# Patient Record
Sex: FEMALE | Race: WHITE | NOT HISPANIC OR LATINO | Employment: FULL TIME
[De-identification: names, ages, dates, MRNs, and addresses within clinical notes are randomized per-mention and may not be internally consistent; named-entity substitution may affect disease eponyms.]

---

## 2017-07-15 ENCOUNTER — HEALTH MAINTENANCE LETTER (OUTPATIENT)
Age: 37
End: 2017-07-15

## 2017-08-16 LAB
ABO + RH BLD: NORMAL
ABO + RH BLD: NORMAL
BLD GP AB SCN SERPL QL: NORMAL
C TRACH DNA SPEC QL PROBE+SIG AMP: NOT DETECTED
HBV SURFACE AG SERPL QL IA: NON REACTIVE
HIV 1+2 AB+HIV1 P24 AG SERPL QL IA: NON REACTIVE
N GONORRHOEA DNA SPEC QL PROBE+SIG AMP: NOT DETECTED
RUBELLA ANTIBODY IGG QUANTITATIVE: NORMAL IU/ML
T PALLIDUM IGG SER QL: NORMAL

## 2017-10-19 ENCOUNTER — TRANSFERRED RECORDS (OUTPATIENT)
Dept: HEALTH INFORMATION MANAGEMENT | Facility: CLINIC | Age: 37
End: 2017-10-19

## 2017-12-26 LAB
GLU, 1 HOUR, 100 G: 104
GLU, 2 HOUR, 100 G: 79
GLU, 3 HOUR, 100 G: 81

## 2018-02-20 LAB — GROUP B STREP PCR: POSITIVE

## 2018-03-06 ENCOUNTER — HOSPITAL ENCOUNTER (INPATIENT)
Facility: CLINIC | Age: 38
LOS: 2 days | Discharge: HOME | End: 2018-03-08
Attending: ADVANCED PRACTICE MIDWIFE | Admitting: ADVANCED PRACTICE MIDWIFE
Payer: COMMERCIAL

## 2018-03-06 DIAGNOSIS — O34.219 VAGINAL BIRTH AFTER CESAREAN (VBAC): Primary | ICD-10-CM

## 2018-03-06 DIAGNOSIS — D50.8 OTHER IRON DEFICIENCY ANEMIA: ICD-10-CM

## 2018-03-06 PROBLEM — Z98.891 H/O: C-SECTION: Status: ACTIVE | Noted: 2018-03-06

## 2018-03-06 LAB
ERYTHROCYTE [DISTWIDTH] IN BLOOD BY AUTOMATED COUNT: 12.6 % (ref 10–15)
HCT VFR BLD AUTO: 35.9 % (ref 35–47)
HGB BLD-MCNC: 12.4 G/DL (ref 11.7–15.7)
MCH RBC QN AUTO: 30.5 PG (ref 26.5–33)
MCHC RBC AUTO-ENTMCNC: 34.5 G/DL (ref 31.5–36.5)
MCV RBC AUTO: 88 FL (ref 78–100)
PLATELET # BLD AUTO: 222 10E9/L (ref 150–450)
RBC # BLD AUTO: 4.07 10E12/L (ref 3.8–5.2)
WBC # BLD AUTO: 11.8 10E9/L (ref 4–11)

## 2018-03-06 PROCEDURE — 85027 COMPLETE CBC AUTOMATED: CPT | Performed by: OBSTETRICS & GYNECOLOGY

## 2018-03-06 PROCEDURE — 25000128 H RX IP 250 OP 636: Performed by: OBSTETRICS & GYNECOLOGY

## 2018-03-06 PROCEDURE — 86901 BLOOD TYPING SEROLOGIC RH(D): CPT | Performed by: OBSTETRICS & GYNECOLOGY

## 2018-03-06 PROCEDURE — 86780 TREPONEMA PALLIDUM: CPT | Performed by: OBSTETRICS & GYNECOLOGY

## 2018-03-06 PROCEDURE — 86850 RBC ANTIBODY SCREEN: CPT | Performed by: OBSTETRICS & GYNECOLOGY

## 2018-03-06 PROCEDURE — 12000030 ZZH R&B OB INTERMEDIATE UMMC

## 2018-03-06 PROCEDURE — 86592 SYPHILIS TEST NON-TREP QUAL: CPT | Performed by: OBSTETRICS & GYNECOLOGY

## 2018-03-06 PROCEDURE — 86900 BLOOD TYPING SEROLOGIC ABO: CPT | Performed by: OBSTETRICS & GYNECOLOGY

## 2018-03-06 RX ORDER — CARBOPROST TROMETHAMINE 250 UG/ML
250 INJECTION, SOLUTION INTRAMUSCULAR
Status: DISCONTINUED | OUTPATIENT
Start: 2018-03-06 | End: 2018-03-07

## 2018-03-06 RX ORDER — LIDOCAINE 40 MG/G
CREAM TOPICAL
Status: DISCONTINUED | OUTPATIENT
Start: 2018-03-06 | End: 2018-03-07

## 2018-03-06 RX ORDER — SODIUM CHLORIDE, SODIUM LACTATE, POTASSIUM CHLORIDE, CALCIUM CHLORIDE 600; 310; 30; 20 MG/100ML; MG/100ML; MG/100ML; MG/100ML
INJECTION, SOLUTION INTRAVENOUS CONTINUOUS
Status: DISCONTINUED | OUTPATIENT
Start: 2018-03-06 | End: 2018-03-07

## 2018-03-06 RX ORDER — OXYTOCIN/0.9 % SODIUM CHLORIDE 30/500 ML
100-340 PLASTIC BAG, INJECTION (ML) INTRAVENOUS CONTINUOUS PRN
Status: COMPLETED | OUTPATIENT
Start: 2018-03-06 | End: 2018-03-07

## 2018-03-06 RX ORDER — MISOPROSTOL 200 UG/1
TABLET ORAL
Status: DISCONTINUED
Start: 2018-03-06 | End: 2018-03-07 | Stop reason: HOSPADM

## 2018-03-06 RX ORDER — IBUPROFEN 800 MG/1
800 TABLET, FILM COATED ORAL
Status: DISCONTINUED | OUTPATIENT
Start: 2018-03-06 | End: 2018-03-07

## 2018-03-06 RX ORDER — ONDANSETRON 2 MG/ML
4 INJECTION INTRAMUSCULAR; INTRAVENOUS EVERY 6 HOURS PRN
Status: DISCONTINUED | OUTPATIENT
Start: 2018-03-06 | End: 2018-03-07

## 2018-03-06 RX ORDER — OXYTOCIN 10 [USP'U]/ML
10 INJECTION, SOLUTION INTRAMUSCULAR; INTRAVENOUS
Status: DISCONTINUED | OUTPATIENT
Start: 2018-03-06 | End: 2018-03-07

## 2018-03-06 RX ORDER — OMEGA-3-ACID ETHYL ESTERS 1 G/1
2 CAPSULE, LIQUID FILLED ORAL 2 TIMES DAILY
COMMUNITY
End: 2023-12-18

## 2018-03-06 RX ORDER — METHYLERGONOVINE MALEATE 0.2 MG/ML
200 INJECTION INTRAVENOUS
Status: DISCONTINUED | OUTPATIENT
Start: 2018-03-06 | End: 2018-03-07

## 2018-03-06 RX ORDER — OXYCODONE AND ACETAMINOPHEN 5; 325 MG/1; MG/1
1 TABLET ORAL
Status: DISCONTINUED | OUTPATIENT
Start: 2018-03-06 | End: 2018-03-07

## 2018-03-06 RX ADMIN — SODIUM CHLORIDE, POTASSIUM CHLORIDE, SODIUM LACTATE AND CALCIUM CHLORIDE 1000 ML: 600; 310; 30; 20 INJECTION, SOLUTION INTRAVENOUS at 23:15

## 2018-03-06 NOTE — IP AVS SNAPSHOT
MRN:[id number]                      After Visit Summary   3/6/2018    Alexandra Agarwal    MRN: [id number]           Thank you!     Thank you for choosing [facility] for your care. Our goal is always to provide you with excellent care. Hearing back from our patients is one way we can continue to improve our services. Please take a few minutes to complete the written survey that you may receive in the mail after you visit with us. Thank you!        Patient Information     Date Of Birth          1980        Designated Caregiver       Most Recent Value    Caregiver    Will someone help with your care after discharge? no      About your hospital stay     You were admitted on:  March 6, 2018 You last received care in the:  [facility]    You were discharged on:  March 8, 2018        Reason for your hospital stay       Maternity care                  Who to Call     For medical emergencies, please call 911.  For non-urgent questions about your medical care, please call your primary care provider or clinic, None          Attending Provider     Provider Specialty    Hedy Cary, JAMEI [facility]ERS [facility]    Sadi Orellana APRN CN[facility]       Primary Care Provider Fax #    Physician No Ref-Primary [phone]      After Care Instructions     Activity       Review discharge instructions            Diet       Resume previous diet            Discharge Instructions - Gestational diabetic patients       Gestational diabetic patients to follow up for fasting blood sugar and 2 hour 75gm glucose load at 6 weeks postpartum.            Discharge Instructions - Hypertensive disorders patients       High Blood pressure patients to follow up in clinic or via home care within one week for a blood pressure check            Discharge Instructions - Postpartum visit       Schedule postpartum visit with your provider and return to clinic in 1-2 weeks.                  Follow-up Appointments     Follow Up and recommended  labs and tests       Follow up in 1-2 weeks with primary midwife                  Further instructions from your care team       Postpartum Vaginal Delivery Instructions    Activity       Ask family and friends for help when you need it.    Do not place anything in your vagina for 6 weeks.    You are not restricted on other activities, but take it easy for a few weeks to allow your body to recover from delivery.  You are able to do any activities you feel up to that point.    No driving until you have stopped taking your pain medications (usually two weeks after delivery).     Call your health care provider if you have any of these symptoms:       Increased pain, swelling, redness, or fluid around your stiches from an episiotomy or perineal tear.    A fever above 100.4 F (38 C) with or without chills when placing a thermometer under your tongue.    You soak a sanitary pad with blood within 1 hour, or you see blood clots larger than a golf ball.    Bleeding that lasts more than 6 weeks.    Vaginal discharge that smells bad.    Severe pain, cramping or tenderness in your lower belly area.    A need to urinate more frequently (use the toilet more often), more urgently (use the toilet very quickly), or it burns when you urinate.    Nausea and vomiting.    Redness, swelling or pain around a vein in your leg.    Problems breastfeeding or a red or painful area on your breast.    Chest pain and cough or are gasping for air.    Problems coping with sadness, anxiety, or depression.  If you have any concerns about hurting yourself or the baby, call your provider immediately.     You have questions or concerns after you return home.     Keep your hands clean:  Always wash your hands before touching your perineal area and stitches.  This helps reduce your risk of infection.  If your hands aren't dirty, you may use an alcohol hand-rub to clean your hands. Keep your nails clean and short.        Pending Results     Date and Time  "Order Name Status Description    3/6/2018 2320 Treponema pallidum antibody confirm In process             Statement of Approval     Ordered          03/08/18 8794  I have reviewed and agree with all the recommendations and orders detailed in this document.  EFFECTIVE NOW     Approved and electronically signed by:  Radhika Pace APRN CNM             Admission Information     Date & Time Provider Department Dept. Phone    3/6/2018 Sadi Orellana APRN CNM [facility] [phone]      Your Vitals Were     Blood Pressure Pulse Temperature Respirations Height Weight    120/74 85 97.6  F (36.4  C) (Oral) 16 1.753 m (5' 9\") 72.4 kg (159 lb 9.6 oz)    Last Period Pulse Oximetry BMI (Body Mass Index)             06/07/2017 100% 23.57 kg/m2         MyChart Information     [facility] ([facility]) gives you secure access to your electronic health record. If you see a primary care provider, you can also send messages to your care team and make appointments. If you have questions, please call your primary care clinic.  If you do not have a primary care provider, please call [phone] and they will assist you.        Care EveryWhere ID     This is your Care EveryWhere ID. This could be used by other organizations to access your [facility] medical records  [id number]        Equal Access to Services     COLLETTE DE ANDA : Lorena salinaso Solizzy, waaxda luqadaha, qaybta kaalmada adeegyada, valery kaur. So [facility] [phone].    ATENCIÓN: Si habla español, tiene a rowland disposición servicios gratuitos de asistencia lingüística. Llame al [phone].    We comply with applicable federal civil rights laws and [place] laws. We do not discriminate on the basis of race, color, national origin, age, disability, sex, sexual orientation, or gender identity.               Review of your medicines      START taking        Dose / Directions    ferrous sulfate 325 (65 FE) MG tablet   Commonly known as:  IRON   Used for:  " Other iron deficiency anemia        Dose:  325 mg   Take 1 tablet (325 mg) by mouth daily (with breakfast)   Quantity:  30 tablet   Refills:  2       ibuprofen 800 MG tablet   Commonly known as:  ADVIL/MOTRIN        Dose:  800 mg   Take 1 tablet (800 mg) by mouth every 8 hours as needed for other (cramping)   Quantity:  90 tablet   Refills:  0       senna-docusate 8.6-50 MG per tablet   Commonly known as:  SENOKOT-S;PERICOLACE        Dose:  1 tablet   Take 1 tablet by mouth 2 times daily as needed for constipation   Quantity:  100 tablet   Refills:  0         CONTINUE these medicines which have NOT CHANGED        Dose / Directions    omega-3 acid ethyl esters 1 G capsule   Commonly known as:  Lovaza        Dose:  2 g   Take 2 g by mouth 2 times daily   Refills:  0       PRENATAL VITAMINS PO        Refills:  0       PROBIOTIC DAILY PO        Refills:  0       VITAMIN D3 PO        Take by mouth daily   Refills:  0            Where to get your medicines      These medications were sent to [facility] [address]  [address] [address]     Phone:  [phone]     ferrous sulfate 325 (65 FE) MG tablet    ibuprofen 800 MG tablet    senna-docusate 8.6-50 MG per tablet                Protect others around you: Learn how to safely use, store and throw away your medicines at www.disposemymeds.org.             Medication List: This is a list of all your medications and when to take them. Check marks below indicate your daily home schedule. Keep this list as a reference.      Medications           Morning Afternoon Evening Bedtime As Needed    ferrous sulfate 325 (65 FE) MG tablet   Commonly known as:  IRON   Take 1 tablet (325 mg) by mouth daily (with breakfast)                                ibuprofen 800 MG tablet   Commonly known as:  ADVIL/MOTRIN   Take 1 tablet (800 mg) by mouth every 8 hours as needed for other (cramping)   Last time this was given:  800 mg  on 3/8/2018  8:20 AM                                omega-3 acid ethyl esters 1 G capsule   Commonly known as:  Lovaza   Take 2 g by mouth 2 times daily                                PRENATAL VITAMINS PO                                PROBIOTIC DAILY PO                                senna-docusate 8.6-50 MG per tablet   Commonly known as:  SENOKOT-S;PERICOLACE   Take 1 tablet by mouth 2 times daily as needed for constipation   Last time this was given:  1 tablet on 3/8/2018  8:21 AM                                VITAMIN D3 PO   Take by mouth daily

## 2018-03-06 NOTE — IP AVS SNAPSHOT
[facility]    [address] [address]    Phone:  [phone]                                       After Visit Summary   3/6/2018    Alexandra Agarwal    MRN: [id number]           After Visit Summary Signature Page     I have received my discharge instructions, and my questions have been answered. I have discussed any challenges I see with this plan with the nurse or doctor.    ..........................................................................................................................................  Patient/Patient Representative Signature      ..........................................................................................................................................  Patient Representative Print Name and Relationship to Patient    ..................................................               ................................................  Date                                            Time    ..........................................................................................................................................  Reviewed by Signature/Title    ...................................................              ..............................................  Date                                                            Time

## 2018-03-07 ENCOUNTER — ANESTHESIA (OUTPATIENT)
Dept: OBGYN | Facility: CLINIC | Age: 38
End: 2018-03-07
Payer: COMMERCIAL

## 2018-03-07 ENCOUNTER — ANESTHESIA EVENT (OUTPATIENT)
Dept: OBGYN | Facility: CLINIC | Age: 38
End: 2018-03-07
Payer: COMMERCIAL

## 2018-03-07 PROBLEM — O34.219 VAGINAL BIRTH AFTER CESAREAN (VBAC): Status: ACTIVE | Noted: 2018-03-07

## 2018-03-07 LAB
ABO + RH BLD: NORMAL
ABO + RH BLD: NORMAL
BLD GP AB SCN SERPL QL: NORMAL
BLOOD BANK CMNT PATIENT-IMP: NORMAL
RPR SER QL: NEGATIVE
SPECIMEN EXP DATE BLD: NORMAL
T PALLIDUM IGG+IGM SER QL: POSITIVE

## 2018-03-07 PROCEDURE — 25000125 ZZHC RX 250: Performed by: ANESTHESIOLOGY

## 2018-03-07 PROCEDURE — 0KQM0ZZ REPAIR PERINEUM MUSCLE, OPEN APPROACH: ICD-10-PCS | Performed by: OBSTETRICS & GYNECOLOGY

## 2018-03-07 PROCEDURE — 25000125 ZZHC RX 250: Performed by: OBSTETRICS & GYNECOLOGY

## 2018-03-07 PROCEDURE — 00HU33Z INSERTION OF INFUSION DEVICE INTO SPINAL CANAL, PERCUTANEOUS APPROACH: ICD-10-PCS | Performed by: ANESTHESIOLOGY

## 2018-03-07 PROCEDURE — 10907ZC DRAINAGE OF AMNIOTIC FLUID, THERAPEUTIC FROM PRODUCTS OF CONCEPTION, VIA NATURAL OR ARTIFICIAL OPENING: ICD-10-PCS | Performed by: ADVANCED PRACTICE MIDWIFE

## 2018-03-07 PROCEDURE — 3E0R3BZ INTRODUCTION OF ANESTHETIC AGENT INTO SPINAL CANAL, PERCUTANEOUS APPROACH: ICD-10-PCS | Performed by: ANESTHESIOLOGY

## 2018-03-07 PROCEDURE — 12000030 ZZH R&B OB INTERMEDIATE UMMC

## 2018-03-07 PROCEDURE — 25000132 ZZH RX MED GY IP 250 OP 250 PS 637: Performed by: ADVANCED PRACTICE MIDWIFE

## 2018-03-07 PROCEDURE — 25000128 H RX IP 250 OP 636: Performed by: ANESTHESIOLOGY

## 2018-03-07 PROCEDURE — 25000128 H RX IP 250 OP 636: Performed by: OBSTETRICS & GYNECOLOGY

## 2018-03-07 PROCEDURE — 72200001 ZZH LABOR CARE VAGINAL DELIVERY SINGLE

## 2018-03-07 PROCEDURE — 0UBC7ZZ EXCISION OF CERVIX, VIA NATURAL OR ARTIFICIAL OPENING: ICD-10-PCS | Performed by: OBSTETRICS & GYNECOLOGY

## 2018-03-07 PROCEDURE — 40000671 ZZH STATISTIC ANESTHESIA CASE

## 2018-03-07 RX ORDER — LANOLIN 100 %
OINTMENT (GRAM) TOPICAL
Status: DISCONTINUED | OUTPATIENT
Start: 2018-03-07 | End: 2018-03-08 | Stop reason: HOSPADM

## 2018-03-07 RX ORDER — OXYTOCIN/0.9 % SODIUM CHLORIDE 30/500 ML
1-24 PLASTIC BAG, INJECTION (ML) INTRAVENOUS CONTINUOUS
Status: DISCONTINUED | OUTPATIENT
Start: 2018-03-07 | End: 2018-03-07

## 2018-03-07 RX ORDER — NALOXONE HYDROCHLORIDE 0.4 MG/ML
.1-.4 INJECTION, SOLUTION INTRAMUSCULAR; INTRAVENOUS; SUBCUTANEOUS
Status: DISCONTINUED | OUTPATIENT
Start: 2018-03-07 | End: 2018-03-07

## 2018-03-07 RX ORDER — ACETAMINOPHEN 325 MG/1
650 TABLET ORAL EVERY 4 HOURS PRN
Status: DISCONTINUED | OUTPATIENT
Start: 2018-03-07 | End: 2018-03-08 | Stop reason: HOSPADM

## 2018-03-07 RX ORDER — MISOPROSTOL 200 UG/1
400 TABLET ORAL
Status: DISCONTINUED | OUTPATIENT
Start: 2018-03-07 | End: 2018-03-08 | Stop reason: HOSPADM

## 2018-03-07 RX ORDER — OXYTOCIN/0.9 % SODIUM CHLORIDE 30/500 ML
340 PLASTIC BAG, INJECTION (ML) INTRAVENOUS CONTINUOUS PRN
Status: DISCONTINUED | OUTPATIENT
Start: 2018-03-07 | End: 2018-03-08 | Stop reason: HOSPADM

## 2018-03-07 RX ORDER — AMOXICILLIN 250 MG
2 CAPSULE ORAL 2 TIMES DAILY PRN
Status: DISCONTINUED | OUTPATIENT
Start: 2018-03-07 | End: 2018-03-08 | Stop reason: HOSPADM

## 2018-03-07 RX ORDER — NALOXONE HYDROCHLORIDE 0.4 MG/ML
.1-.4 INJECTION, SOLUTION INTRAMUSCULAR; INTRAVENOUS; SUBCUTANEOUS
Status: DISCONTINUED | OUTPATIENT
Start: 2018-03-07 | End: 2018-03-08 | Stop reason: HOSPADM

## 2018-03-07 RX ORDER — EPHEDRINE SULFATE 50 MG/ML
5 INJECTION, SOLUTION INTRAMUSCULAR; INTRAVENOUS; SUBCUTANEOUS
Status: DISCONTINUED | OUTPATIENT
Start: 2018-03-07 | End: 2018-03-07

## 2018-03-07 RX ORDER — IBUPROFEN 800 MG/1
800 TABLET, FILM COATED ORAL EVERY 6 HOURS PRN
Status: DISCONTINUED | OUTPATIENT
Start: 2018-03-07 | End: 2018-03-08 | Stop reason: HOSPADM

## 2018-03-07 RX ORDER — NALBUPHINE HYDROCHLORIDE 10 MG/ML
2.5-5 INJECTION, SOLUTION INTRAMUSCULAR; INTRAVENOUS; SUBCUTANEOUS EVERY 6 HOURS PRN
Status: DISCONTINUED | OUTPATIENT
Start: 2018-03-07 | End: 2018-03-07

## 2018-03-07 RX ORDER — AMOXICILLIN 250 MG
1 CAPSULE ORAL 2 TIMES DAILY PRN
Status: DISCONTINUED | OUTPATIENT
Start: 2018-03-07 | End: 2018-03-08 | Stop reason: HOSPADM

## 2018-03-07 RX ORDER — OXYTOCIN/0.9 % SODIUM CHLORIDE 30/500 ML
100 PLASTIC BAG, INJECTION (ML) INTRAVENOUS CONTINUOUS
Status: DISCONTINUED | OUTPATIENT
Start: 2018-03-07 | End: 2018-03-08 | Stop reason: HOSPADM

## 2018-03-07 RX ORDER — HYDROCORTISONE 2.5 %
CREAM (GRAM) TOPICAL 3 TIMES DAILY PRN
Status: DISCONTINUED | OUTPATIENT
Start: 2018-03-07 | End: 2018-03-08 | Stop reason: HOSPADM

## 2018-03-07 RX ORDER — BISACODYL 10 MG
10 SUPPOSITORY, RECTAL RECTAL DAILY PRN
Status: DISCONTINUED | OUTPATIENT
Start: 2018-03-09 | End: 2018-03-08 | Stop reason: HOSPADM

## 2018-03-07 RX ORDER — OXYTOCIN 10 [USP'U]/ML
10 INJECTION, SOLUTION INTRAMUSCULAR; INTRAVENOUS
Status: DISCONTINUED | OUTPATIENT
Start: 2018-03-07 | End: 2018-03-08 | Stop reason: HOSPADM

## 2018-03-07 RX ORDER — OXYCODONE HYDROCHLORIDE 5 MG/1
5 TABLET ORAL EVERY 4 HOURS PRN
Status: DISCONTINUED | OUTPATIENT
Start: 2018-03-07 | End: 2018-03-08 | Stop reason: HOSPADM

## 2018-03-07 RX ORDER — LIDOCAINE HYDROCHLORIDE AND EPINEPHRINE 15; 5 MG/ML; UG/ML
INJECTION, SOLUTION EPIDURAL PRN
Status: DISCONTINUED | OUTPATIENT
Start: 2018-03-07 | End: 2018-03-07

## 2018-03-07 RX ADMIN — SODIUM CHLORIDE, POTASSIUM CHLORIDE, SODIUM LACTATE AND CALCIUM CHLORIDE: 600; 310; 30; 20 INJECTION, SOLUTION INTRAVENOUS at 02:52

## 2018-03-07 RX ADMIN — Medication 10 ML/HR: at 02:06

## 2018-03-07 RX ADMIN — SODIUM CHLORIDE, POTASSIUM CHLORIDE, SODIUM LACTATE AND CALCIUM CHLORIDE: 600; 310; 30; 20 INJECTION, SOLUTION INTRAVENOUS at 06:32

## 2018-03-07 RX ADMIN — IBUPROFEN 800 MG: 800 TABLET, FILM COATED ORAL at 20:21

## 2018-03-07 RX ADMIN — Medication 8 ML: at 02:04

## 2018-03-07 RX ADMIN — LIDOCAINE HYDROCHLORIDE,EPINEPHRINE BITARTRATE 2 ML: 15; .005 INJECTION, SOLUTION EPIDURAL; INFILTRATION; INTRACAUDAL; PERINEURAL at 01:58

## 2018-03-07 RX ADMIN — OXYTOCIN-SODIUM CHLORIDE 0.9% IV SOLN 30 UNIT/500ML 340 ML/HR: 30-0.9/5 SOLUTION at 17:41

## 2018-03-07 RX ADMIN — SODIUM CHLORIDE, POTASSIUM CHLORIDE, SODIUM LACTATE AND CALCIUM CHLORIDE: 600; 310; 30; 20 INJECTION, SOLUTION INTRAVENOUS at 14:28

## 2018-03-07 RX ADMIN — SODIUM CHLORIDE, POTASSIUM CHLORIDE, SODIUM LACTATE AND CALCIUM CHLORIDE: 600; 310; 30; 20 INJECTION, SOLUTION INTRAVENOUS at 00:18

## 2018-03-07 NOTE — ANESTHESIA PREPROCEDURE EVALUATION
Anesthesia Evaluation       history and physical reviewed . Pt has had prior anesthetic. Type: Regional    No history of anesthetic complications          ROS/MED HX    ENT/Pulmonary:  - neg pulmonary ROS     Neurologic:  - neg neurologic ROS     Cardiovascular:  - neg cardiovascular ROS   (+) ----. : . . . :. . No previous cardiac testing       METS/Exercise Tolerance:  >4 METS   Hematologic:  - neg hematologic  ROS       Musculoskeletal:  - neg musculoskeletal ROS       GI/Hepatic:  - neg GI/hepatic ROS       Renal/Genitourinary:  - ROS Renal section negative       Endo:  - neg endo ROS       Psychiatric:         Infectious Disease:  - neg infectious disease ROS       Malignancy:      - no malignancy   Other:    (+) Possibly pregnant                    Physical Exam  Normal systems: cardiovascular, pulmonary and dental    Airway   Mallampati: II  TM distance: >3 FB  Neck ROM: full    Dental     Cardiovascular       Pulmonary           neg OB ROS                 Anesthesia Plan      History & Physical Review  History and physical reviewed and following examination; no interval change.    ASA Status:  2 .  OB Epidural Asa: 2   NPO Status:  Full stomach    Plan for Epidural     Agree with plan for epidural      Postoperative Care  Postoperative pain management:  Neuraxial analgesia.      Consents  Anesthetic plan, risks, benefits and alternatives discussed with:  Patient and Patient.  Use of blood products discussed: No .   .        ANESTHESIA PREOP EVALUATION    Procedure: * No surgery found *    HPI: Alexandra Agarwal is a 37 year old female presenting for labor epidural.     PMHx/PSHx/ROS:  Past Medical History:   Diagnosis Date     NO ACTIVE PROBLEMS        Past Surgical History:   Procedure Laterality Date      SECTION       GYN SURGERY  15    surgical birth         Past Anes Hx: No personal or family h/o anesthesia problems    Soc Hx:   Social History   Substance Use Topics     Smoking status: Never  "Smoker     Smokeless tobacco: Never Used     Alcohol use 0.6 - 1.2 oz/week     1 - 2 Standard drinks or equivalent per week       Allergies:   Allergies   Allergen Reactions     Penicillin G Itching     Amoxicillin-Pot Clavulanate Rash       Meds:   Prescriptions Prior to Admission   Medication Sig Dispense Refill Last Dose     Probiotic Product (PROBIOTIC DAILY PO)    3/6/2018 at Unknown time     omega-3 acid ethyl esters (LOVAZA) 1 G capsule Take 2 g by mouth 2 times daily   3/6/2018 at Unknown time     Cholecalciferol (VITAMIN D3 PO) Take by mouth daily   3/6/2018 at Unknown time     Prenatal Multivit-Min-Fe-FA (PRENATAL VITAMINS PO)    3/6/2018 at Unknown time       No current outpatient prescriptions on file.       Physical Exam:  Vitals: /84  Temp 36.8  C (98.3  F) (Oral)  Resp 20  Ht 1.753 m (5' 9\")  Wt 72.4 kg (159 lb 9.6 oz)  LMP 06/07/2017  BMI 23.57 kg/m2  BMI= Body mass index is 23.57 kg/(m^2).      Labs:  UPT: No results found for: HCGQUANT      BMP:  No results for input(s): NA, POTASSIUM, CHLORIDE, CO2, BUN, CR, GLC, ELROY in the last 20377 hours.  CBC:   Recent Labs   Lab Test  03/06/18   2320   WBC  11.8*   RBC  4.07   HGB  12.4   HCT  35.9   MCV  88   MCH  30.5   MCHC  34.5   RDW  12.6   PLT  222     Coags:  No results for input(s): INR, PTT, FIBR in the last 57948 hours.    Assessment/Plan:  - ASA 2  - Epidural  - PIV  - Antibiotics per surgery  - PONV prophylaxis  - Relevant risks, benefits, alternatives and the anesthetic plan were discussed with patient/family or family representative.  All questions were answered and there was agreement to proceed.      Adalberto LUTHER3, D.O.    3/7/2018  1:39 AM      "

## 2018-03-07 NOTE — PLAN OF CARE
Data: Patient presented to [facility] at 2226.   Reason for maternal/fetal assessment per patient is Laboring  .  Patient is a . Prenatal record reviewed.      Obstetric History       T1      L1     SAB0   TAB0   Ectopic0   Multiple0   Live Births0       # Outcome Date GA Lbr Hao/2nd Weight Sex Delivery Anes PTL Lv   2 Current            1 Term               . Medical history:   Past Medical History:   Diagnosis Date     NO ACTIVE PROBLEMS    . Gestational Age 39w0d. VSS. Fetal movement present. Patient denies  vaginal discharge, pelvic pressure, UTI symptoms, GI problems, bloody show, vaginal bleeding, edema, headache, visual disturbances, epigastric or URQ pain, abdominal pain, rupture of membranes. Support persons , , lay midwife and midwife student present.  Action: Verbal consent for EFM. Triage assessment completed.  Fetal assessment: Presumed adequate fetal oxygenation documented (see flow record).   Response: Dr. Robison informed of arrival. Plan per provider is direct admit- IV bolus for fetal tachycardia. Patient verbalized agreement with plan. Patient transferred to room 473 ambulatory, oriented to room and call light. .

## 2018-03-07 NOTE — PROGRESS NOTES
"Labor progress note    S: Pt agreeable to SVE. FOB, yoselin, and [facility] midwife for support.     O:  Blood pressure 107/67, temperature 98.5  F (36.9  C), temperature source Oral, resp. rate 16, height 1.753 m (5' 9\"), weight 72.4 kg (159 lb 9.6 oz), last menstrual period 2017, SpO2 97 %.  General appearance: comfortable.  Contractions: Every 5-8 minutes. 60-80 seconds duration.  Palpate: moderate.  Soft resting tone.   FHT: Baseline 155 with moderate variability. Accelerations are present. no decelerations present.  ROM: clear fluid. Membranes have been ruptured for 2 hours.  PELVIC EXAM:10/ 100%/+1.  Practice push with no fetal descent, pt with some rectal pressure but coping well       Pitocin- none,  Antibiotics- Pt refuses GBS prophylaxis  LR at maintenance rate  Epidural in situ    A:  37 year old  with IUP @ 39w0d second stage labor   TOLAC  Birth Center transfer  Fetal Heart Rate Tracing category one  GBS- positive- not treated per pt refusal      Patient Active Problem List   Diagnosis     H/O:          P:  Frequent position changes to facilitate labor with epidural anesthesia.  Consider labor augmentation with Pitocin if pt agreeable prn.   MD consultant on call Jordan/ available in house prn   Reassess 1-2 hours of laboring down     Hedy AYALA CNM          "

## 2018-03-07 NOTE — PROGRESS NOTES
"Labor progress note    S: Pt has been making very little progress with minimal maternal effort with pushing effort.  Agreeable for SVE and plan of care discussion     O:  Blood pressure (!) 131/91, temperature 98.7  F (37.1  C), temperature source Oral, resp. rate 16, height 1.753 m (5' 9\"), weight 72.4 kg (159 lb 9.6 oz), last menstrual period 2017, SpO2 92 %.  General appearance: comfortable.  Contractions: Every 4-8 minutes. 60-80 seconds duration.  Palpate: moderate.  Soft resting tone.   FHT: Baseline 155 with modearte variability. Accelerations are present. +no recurrent variable decelerations present.  ROM: clear fluid. Membranes have been ruptured for 6 hours.  PELVIC EXAM:10/ 100%/+1 - No real fetal descent but pt not feeling rectal pressure and giving very minimal effort with coached pushing.        Pitocin- none,  Antibiotics- Pt refused GBS prophylaxis  LR at maintenance rate  Epidural in situ    A:  37 year old  with IUP @ 39w0d second stage labor and minimal/no progress   TOLAC  Transfer from Birth Center  Prolonged Second stage without delivery - very short amount of active pushing  Fetal Heart Rate Tracing category two, moderate variability, +Accels  GBS- positive- not treated per pt refusal  AROM clear <18 hours, afebrile  Hypertension without symptoms    Patient Active Problem List   Diagnosis     H/O:          P:  Frequent position changes to facilitate labor with epidural anesthesia.  Pain medication options reviewed with pt. Pt declines epidural rate to be turned down to facilitate second stage.  Labor augmentation with Pitocin reviewed with pt. Agreeable to plan after private discussion with [facility] staff.  Plan pitocin for 1 hour and then resume active second stage or earlier if change in maternal or fetal status.   MD on call Lucie consulted and agrees with plan.   One elevated BP taken while pt moving, recheck and plan Pre E labs prn.   Observation and " reevaluate in 1-2 hours prn    Hedy AYALA, DELPHINEM

## 2018-03-07 NOTE — ANESTHESIA PROCEDURE NOTES
Epidural Procedure Note    Staff:     Anesthesiologist:  BRISA CORBETT    Resident/CRNA:  VIV NAILS    Procedure performed by resident/CRNA in the presence of a teaching physician    Location: OB     Procedure start time:  3/7/2018 1:55 AM     Procedure end time:  3/7/2018 2:05 AM   Pre-procedure checklist:   patient identified, IV checked, site marked, risks and benefits discussed, informed consent, monitors and equipment checked, pre-op evaluation, at physician/surgeon's request and post-op pain management      Correct Patient: Yes      Correct Position: Yes      Correct Site: Yes      Correct Procedure: Yes      Correct Laterality:  Yes    Site Marked:  Yes  Procedure:     Procedure:  Epidural catheter    ASA:  2    Position:  Sitting    Sterile Prep: chloraprep      Insertion site:  L3-4    Local skin infiltration:  1% lidocaine    amount (mL):  2    Approach:  Midline    Needle gauge (G):  17    Needle Length (in):  3.5    Block Needle Type:  Touhy    Injection Technique:  LORT saline and LORT air    TAPAN at (cm):  5    Attempts:  1    Redirects:  0    Catheter gauge (G):  19    Catheter threaded easily: Yes      Threaded to cm at skin:  10    Threaded in epidural space (cm):  5    Paresthesias:  No    Aspiration negative for Heme or CSF: Yes       Local anesthetic:  Lidocaine 1.5% w/ 1:200,000 epinephrine    Test dose time:  02:00    Test dose negative for signs of intravascular, subdural or intrathecal injection: Yes

## 2018-03-07 NOTE — PROGRESS NOTES
"[facility]  Labor Progress Note    S:  Patient breathing through contractions on birthing ball. Worried that she will not know what to do next.     O:   Patient Vitals for the past 4 hrs:   BP Temp Temp src Resp Height Weight   18 2255 115/84 98.3  F (36.8  C) Oral 20 1.753 m (5' 9\") 72.4 kg (159 lb 9.6 oz)     Patient on hands and knees, rolling on birthing ball.   Difficult cervical exam due to patient position and discomfort. Large amount of cervix still palpated, however, not able to accurately measure due to patient discomfort. -1 to 0 station.     FHT: Baseline 140, moderate variability, accelerations present, no decelerations  [place]: 3-4 contractions in 10 minutes    A/P:  Ms. Alexandra Agarwal is a 37 year old  at 39w0d by LMP c/w 19w3d US, here from a birth center for fetal tachycardia. Pregnancy notable for 1 prior CS and GBS positive status.     Labor: - Progressing spontaneously    - Using nitrous intermittently for pain control, declines other methods   - GBS positive, declines antibiotic prophylaxis     - Discussed rechecking cervix when she feels more pressure     FWB: - Category I FHT   - Continue continuous monitoring     PNC: - Rh positive, Rubella immune, GCT passed, Placenta anterior    Fabi Olvera MD  Ob/Gyn, PGY-2  3/7/2018, 1:01 AM  "

## 2018-03-07 NOTE — PLAN OF CARE
Problem: Patient Care Overview  Goal: Plan of Care/Patient Progress Review  Outcome: Improving  Pt laboring naturally using hands and knees and birthing ball.  Pt feeling pushy at 0130.  Dr Robison notified and performed SVE- 7cm with swollen anterior lip.  Pt educated on options by Dr Robison.  Pt opted for epidural to help her relax and not push prior to complete dilation.  Pt was hoping for unmedicated delivery but her highest priority is a vaginal delivery so is willing to get epidural if could help her have a .  Anesthesia notified and performed epidural at 0200.  Pt did not get relief from first epidural, Dr Smith notified of continued pain.  Epidural pulled back slightly and bolused by Dr Smith at 0305.  Pt continued to report no change in pain.  Dr Smith, Dr Newton, and Dr Robison all aware and actively involved in patient care.  At 0350, epidural replaced by Dr Newton with good relief.  Pt able to rest after second epidural placement.  Frequent position changes and using peanut ball to assist with fetal descent.   and support person present.  Continue plan of care.

## 2018-03-07 NOTE — H&P
[facility] Labor and Delivery History and Physical    Alexandra Agarwal MRN# [id number]   Age: 37 year old YOB: 1980     Date of Admission:  3/6/2018    Primary care provider: No Ref-Primary, Physician         CC:    Concerns for Fetal tachycardia at Outside Birth Center during TOLAC         HPI:   Alexandra Agarwal is a 37 year old  at 38w6d by LMP c/w 19w3d US who presents from [facility] for transfer of care secondary to concerns for fetal tachycardia. Labor started at 2 am today with labor contractions becoming every 5 minutes apart around 2-3 pm. She arrived at the Birth Center at 6 pm for labor evaluation.    She has been receiving prenatal care with [facility] and had been planning an out of hospital . She is GBS positive and declined antibiotic prophylaxis on arrival to [place]. Records from [place] state her last cervical exam was 3/75/0 with intact membranes at time of transfer. Fetal tachycardia was noted between the 170s-190s at times during Doppler evaluation and she was recommended to present to the hospital for further labor monitoring and continuous fetal monitoring.     On arrival, patient gives same labor history as above.  Has been having some bloody show, no murphy bleeding.  Denies LOF.  + FM.  Having a girl.  Denies HA, scotoma, SOB, RUQ pain or increased swelling in her extremities.    Pregnancy complicated by:  1. 1 prior  for arrest of descent after 4 hours of pushing  2. Itching/Rash with penicillin  3. GBS positive, DECLINES PROPHYLAXIS          Pregnancy history:     OBSTETRIC HISTORY:    1 prior CS at [facility] in 2014 for arrest of descent at 39 weeks, complicated by triple I: PLTCS completed via [facility]     Prenatal Labs from outside records:  A+, ulises negative  HepB/RPR/HIV ngative  Gc/Ch negative  Rubella immune  Hgb 11.2, plts 193 on   Passed 2 hr GTT  Pap NILM    GBS Status:    Lab Results   Component Value Date    GBS positive 2018     Medication Prior to Admission  Prescriptions Prior to Admission   Medication Sig Dispense Refill Last Dose     Probiotic Product (PROBIOTIC DAILY PO)    3/6/2018 at Unknown time     omega-3 acid ethyl esters (LOVAZA) 1 G capsule Take 2 g by mouth 2 times daily   3/6/2018 at Unknown time     Cholecalciferol (VITAMIN D3 PO) Take by mouth daily   3/6/2018 at Unknown time     Prenatal Multivit-Min-Fe-FA (PRENATAL VITAMINS PO)    3/6/2018 at Unknown time   .     Maternal Past Medical History:   Anxiety       Maternal Past Surgical History:    Section  Wrist Surgery        Family History:     Family History   Problem Relation Age of Onset     Anxiety Disorder Sister      Substance Abuse Paternal Grandfather      MENTAL ILLNESS Father      MENTAL ILLNESS Paternal Grandfather      Depression Father      Depression Paternal Grandfather      Depression Sister      DIABETES Father      Hypertension Father      Thyroid Disease Mother      Obesity Father      Prostate Cancer Paternal Grandfather      Other Cancer Mother           Social History:   Lives with partner Gorge and son. Denies tobacco, ETOH or drug use during pregnancy.         Physical Exam:     Vitals:    18 2255   BP: 115/84   Resp: 20   Temp: 98.3  F (36.8  C)   TempSrc: Oral     Gen: Well appearing, no apparent distress  Cardio: RRR, no murmurs  Resp: CTAB, no wheezes  Abdomen: gravid, soft, nontender. EFW 7 pounds  Cervix: 5/90/-1, stretchy  Presentation:Cephalic by cervical exam    Fetal Heart Rate Tracing: On arrival noted wavering baseline between 150-160, moderate variability, accelerations through contractions, some variable/late decelerations although hard to discern given difficulty with placement of tocometry due to patient positioning  Tocometer: 3 contractions in 10 minutes    Imaging:    Fetal Anatomy Survey   GA: 19w3d      Single IUP, Fetal Anatomy WNL, Placenta:  anterior           Assessment:   Alexandra Agarwal is a 37 year old  at 38w6d by LMP c/w 19w3d US admitted for transfer of care secondary to concerns for fetal tachycardia at outside birth center.        Plan:     #Labor  - Admit to L&D  - Plans no intervention for labor analgesia at this time, but aware of options and will ask for intervention as desires  - ABO/Rh, CBC, RPR pending  - GBS positive: PATIENT REFUSES GBS PROPHYLAXIS    #Fetal tachycardia  -Improved with IVF bolus and O2 administration  -Currently Category II tracing with moderate variability and intermittent variable decelerations, continue to assess labor progress and intervene as appropriate    Fabi Olvera MD  OB/GYN PGY2  3/6/2018 11:26 PM    Staff MD Note    I appreciate the note by Dr. Olvera.  Any necessary changes have been made by me.  I evaluated the patient with the resident and agree with the assessment and plan.    Leanna Robison MD

## 2018-03-07 NOTE — PROGRESS NOTES
"Labor progress note    S: Pt coping well with contractions, resting in left lateral with peanut ball.  Agreeable to ST and SVE for plan of care discussion.  Reviewed recommendation for AROM if unchanged and BBOW to facilitate fetal rotation/descent.  Reviewed risk/benefit of AROM and pt consents to AROM if clinically indicated.       O:  Blood pressure 107/67, temperature 98.5  F (36.9  C), temperature source Oral, resp. rate 16, height 1.753 m (5' 9\"), weight 72.4 kg (159 lb 9.6 oz), last menstrual period 2017, SpO2 97 %.  General appearance: comfortable.  Contractions: Every 3-6 minutes. 70-90 seconds duration.  Palpate: moderate.  Soft resting tone.   FHT: Baseline 155 with moderate variability. Accelerations are present. +non recurrent variable decelerations present.  ROM: AROM for moderate clear fluid with pt consent after risk/benefit discussion.   PELVIC EXAM: Posterior lip maternal R/ 100%/ Posterior/ soft&stretchy/ 0.  Well engaged in pelvis.   Recheck SVE s/p AROM posterior lip unable to be reduced.      Pitocin- none,  Antibiotics- GBS+, pt declined.   LR at maintenance rate  Epidural in situ  ST by RN for adequate amount    A:  37 year old  with IUP @ 39w0d active labor   [facility] transfer  TOLAC  Fetal Heart Rate Tracing category two. Moderate variability, +Accels.   GBS- positive- not treated per pt refusal      Patient Active Problem List   Diagnosis     H/O:          P:  Frequent position changes to facilitate labor with epidural anesthesia.  Pt up to frog leg to facilitate rotation;descent  Reviewed birth plan with pt and FOB - clarified items if C/section including ok to dry/stim, and necessity of NICU assessment before brought to mother for skin to skin with tube top.   Recheck SVE 2-4 hours and prn.   Close maternal/fetal surveillance for s/s of infeciton   MD consultant on call [place]/ available in house prn   Reevaluate in 2-4 hours prn    Hedy Cary APRN, " CNM

## 2018-03-07 NOTE — PROGRESS NOTES
"Labor Progress Note  3/7/18    S: In to assess patient.  Having more pressure, working through contractions.  Not getting much help with Nitrous.  Comfortable on hands and knees.    O:  Vitals:    18 2255   BP: 115/84   Resp: 20   Temp: 98.3  F (36.8  C)   TempSrc: Oral   Weight: 72.4 kg (159 lb 9.6 oz)   Height: 1.753 m (5' 9\")     Cvx: 7/-1, has swollen anterior lip, concern for patient unintentionally pushing with contraction given appearance of tocometry  Membranes: Hard to tell if ROM secondary to bloody show, no palpable bag with exam    FHT: 150 bpm baseline, Moderate variability, Accelerations present, No decelerations  Tocometry: q2-3 minutes    A/P: 36 yo  @ 39w0d presents as RADHA in labor with fetal tachycardia, resolved with IVF and O2   1) Labor: Progressing spontaneously.  Discussed concerns of swelling anteriorly of cervix.  Recommend position changes from hands and knees.  If having too much discomfort recommend regional anesthesia for rest.  Patient verbalizes concerns about Epidural, discussed understanding her concerns but also want to ensure she gets appropriate intervention to continue labor efforts.  After discussion agreeable to Epidural.  Anesthesia called about plan.    Leanna Robison MD  "

## 2018-03-07 NOTE — PROGRESS NOTES
"Labor progress note    S: CNM to bedside with MD team for AM rounds on pt. Pt was a planned birth center transfer for fetal tachycardia and slow moving progress, TOLAC.  Pt now resting more comfortably with epidural infusing.  FOB and  at bedside for support.      O:  Blood pressure 107/67, temperature 98.5  F (36.9  C), temperature source Oral, resp. rate 16, height 1.753 m (5' 9\"), weight 72.4 kg (159 lb 9.6 oz), last menstrual period 2017, SpO2 97 %.  General appearance: comfortable.  Contractions: Every 4-8 minutes. 60-80 seconds duration.  Palpate: moderate.  Soft resting tone.   FHT: Baseline 155 with moderaet variability. Accelerations are present. no decelerations present.  ROM: not ruptured. .  PELVIC EXAM:deferred.  Recent SVE reported by RN done with ST 9cm and 0 station.   (previous SVE at 0130 7cm with swollen anterior lip)    Pitocin- none, pt declines to discuss it as an option.  Antibiotics- none. Pt is GBS+ but declines ABX.  LR at maintenance rate  Epidural in situy    A:  37 year old  with IUP @ 39w0d active labor   TOLAC  Afebrile  Planned Birth Center birth transfer  Fetal Heart Rate Tracing category two. Moderate variability  GBS- positive- not treated per pt refusal    Patient Active Problem List   Diagnosis     H/O:          P:  -Frequent position changes to facilitate labor with epidural anesthesia.  Readjusted to left lateral with peanut ball  -MD team briefly discussed labor augmentation with AROM if no change at next SVE, pt agreeable to consider.  Pt states she does not want to discuss pitocin as an option.  FOB went to gather birth plan.   -Reviewed with MD team that FHR tracing no longer with tachycardia, cervical change noted, and pt strong desire for CNM cares, appropriate for transfer to CNM service for labor management.  Since pt is TOLAC with unproven pelvis, MD team aware that consult prn from CNM team may occur should operative delivery be recommended " by CNM team.  Bedside and Charge RNs updated on transfer of service.   -MD consultant on call [place]/ is available in house prn   - Reevaluate in 2-4 hours prn.  Consider SVE at 4 hours with AROM discussion if no cervical change.      Hedy Cary APRN, CNM

## 2018-03-07 NOTE — PLAN OF CARE
Problem: Patient Care Overview  Goal: Individualization & Mutuality  Outcome: Therapy, progress towards functional goals is fair  Labor is fairly progressing. See flow sheet for fetal and uterine monitoring. Patient labor down for two hours. Pushed for 45 minutes without good success and stopped pushing because the patient wants a break. Pain well controlled under epidural anesthesia. Will start pushing in 15-

## 2018-03-07 NOTE — PLAN OF CARE
Problem: Patient Care Overview  Goal: Individualization & Mutuality  Outcome: No Change  Labor is not progressing as expected, patient comfortable with epidural anesthesia. See flow sheet for fetal and uterine monitoring.Two trials of pushing then order to start Pitocin. Patient is resting now with peanut ball . Will continue to monitor labor and notify provider with changes.

## 2018-03-07 NOTE — PROGRESS NOTES
[facility]  Labor Progress Note    S:  Resting comfortably after 2nd epidural placement.     O:   Patient Vitals for the past 4 hrs:   BP Temp Temp src Resp SpO2   18 0437 107/66 - - - 97 %   18 0430 114/79 - - 16 97 %   18 0428 124/74 - - - 97 %   18 0416 104/69 - - 18 97 %   18 0411 107/69 - - - 97 %   18 0408 116/67 - - - 97 %   18 0400 120/59 - - - 97 %   18 0358 96/53 - - - -   18 0356 128/74 - - 18 -   18 0354 116/75 - - - 97 %   18 0353 115/67 - - - 98 %   18 0350 120/65 - - 18 98 %   18 0335 123/63 - - - 98 %   18 0325 119/71 - - - 99 %   18 0320 116/57 - - 18 98 %   18 0314 119/56 - - - 99 %   18 0312 119/62 - - - -   18 0311 126/63 - - 18 98 %   18 0300 - 98.9  F (37.2  C) Oral 18 -   18 0244 122/66 - - 18 99 %   18 0239 118/77 - - - 99 %   18 0234 124/86 - - 18 99 %   18 0229 120/63 - - - 99 %   18 0224 126/75 - - 18 99 %   18 0219 131/77 - - - 100 %   18 0214 136/80 - - 18 99 %   18 0209 127/90 - - - 99 %   18 0207 123/81 - - 18 99 %   18 0205 124/77 - - - -   18 0203 125/68 - - 20 -   18 0201 142/71 - - - 98 %   18 0157 150/78 - - 20 -     FHT: Baseline 140, moderate variability, accelerations absent, no decelerations  [place]: 3 contractions in 10 minutes    A/P:  Ms. Alexandra Agarwal is a 37 year old  at 39w0d by LMP c/w 19w3d US, here from a home birth center for fetal tachycardia. Pregnancy notable for 1 prior CS and GBS positive status.     TOLAC: - Now comfortable with an epidural   - Declines frequent cervical checks, discuss with patient prior to checking   - GBS positive, declines antibiotic prophylaxis      FWB: - Category I FHT   - Continue continuous monitoring     PNC: - Rh positive, Rubella immune, GCT passed, Placenta anterior    Fabi Olvera MD  Ob/Gyn, PGY-2    Staff MD  Note    I appreciate the note by Dr. Olvera.  Any necessary changes have been made by me.  I evaluated the patient with the resident and agree with the assessment and plan.  Discussed with patient plan for transfer of care to oncoming MD Dr. Faulkner.  Will ensure team understands plan of care moving forward.    Leanna Robison MD

## 2018-03-08 VITALS
HEART RATE: 85 BPM | SYSTOLIC BLOOD PRESSURE: 120 MMHG | DIASTOLIC BLOOD PRESSURE: 74 MMHG | TEMPERATURE: 97.6 F | HEIGHT: 69 IN | WEIGHT: 159.6 LBS | BODY MASS INDEX: 23.64 KG/M2 | OXYGEN SATURATION: 100 % | RESPIRATION RATE: 16 BRPM

## 2018-03-08 LAB — HGB BLD-MCNC: 9.2 G/DL (ref 11.7–15.7)

## 2018-03-08 PROCEDURE — 85018 HEMOGLOBIN: CPT | Performed by: ADVANCED PRACTICE MIDWIFE

## 2018-03-08 PROCEDURE — 36415 COLL VENOUS BLD VENIPUNCTURE: CPT | Performed by: ADVANCED PRACTICE MIDWIFE

## 2018-03-08 PROCEDURE — 25000132 ZZH RX MED GY IP 250 OP 250 PS 637: Performed by: ADVANCED PRACTICE MIDWIFE

## 2018-03-08 RX ORDER — IBUPROFEN 800 MG/1
800 TABLET, FILM COATED ORAL EVERY 8 HOURS PRN
Qty: 90 TABLET | Refills: 0 | Status: SHIPPED | OUTPATIENT
Start: 2018-03-08 | End: 2023-12-18

## 2018-03-08 RX ORDER — FERROUS SULFATE 325(65) MG
325 TABLET ORAL
Qty: 30 TABLET | Refills: 2 | Status: SHIPPED | OUTPATIENT
Start: 2018-03-08 | End: 2023-12-18

## 2018-03-08 RX ORDER — AMOXICILLIN 250 MG
1 CAPSULE ORAL 2 TIMES DAILY PRN
Qty: 100 TABLET | Refills: 0 | Status: SHIPPED | OUTPATIENT
Start: 2018-03-08 | End: 2023-12-18

## 2018-03-08 RX ADMIN — ACETAMINOPHEN 650 MG: 325 TABLET, FILM COATED ORAL at 03:25

## 2018-03-08 RX ADMIN — IBUPROFEN 800 MG: 800 TABLET, FILM COATED ORAL at 02:10

## 2018-03-08 RX ADMIN — IBUPROFEN 800 MG: 800 TABLET, FILM COATED ORAL at 08:20

## 2018-03-08 RX ADMIN — SENNOSIDES AND DOCUSATE SODIUM 1 TABLET: 8.6; 5 TABLET ORAL at 08:21

## 2018-03-08 NOTE — PROVIDER NOTIFICATION
03/08/18 0925   Provider Notification   Provider Name/Title Midwife/ Radhika Hanson   Method of Notification Phone  (Pt. radha get meds for discharge and wants hearing screen)   Request Evaluate-Remote   Notification Reason Other

## 2018-03-08 NOTE — PLAN OF CARE
Problem: Patient Care Overview  Goal: Plan of Care/Patient Progress Review  Outcome: Improving  Data: Alexandra Agarwal transferred to 7120 via wheelchair at 1950. Baby transferred via parent's arms.  Action: Receiving unit notified of transfer: Yes. Patient and family notified of room change. Report given to Naomi LÓPEZ RN at 1945. Belongings sent to receiving unit. Accompanied by Registered Nurse. Oriented patient to surroundings. Call light within reach. ID bands double-checked with receiving RN.  Response: Patient tolerated transfer and is stable.

## 2018-03-08 NOTE — PLAN OF CARE
Problem: Patient Care Overview  Goal: Plan of Care/Patient Progress Review  Outcome: No Change  VSS. Postpartum checks WDL except fundus shifted to the right upon transfer to unit. Unable to void after delivery, straight cathed for 850ml of urine. Able to ambulate with SBA, c/o dizziness when up. Cramping pain managed with ibuprofen. Declined ice or tucks for swollen perineum. Breastfeeding on demand.  supportive at bedside.

## 2018-03-08 NOTE — DISCHARGE SUMMARY
[facility] Discharge Summary    Alexandra Agarwal MRN# [id number]   Age: 37 year old YOB: 1980     Date of Admission:  3/6/2018  Date of Discharge::  No discharge date for patient encounter.  Admitting Physician:  JAMIE Lewis CNM  Discharge Physician:  Radhika Pace CNM, MS      Home clinic: [facility]          Admission Diagnoses:   H/O:   Fetal Tachycardia  Intrauterine pregnancy at 39 weeks gestation       Discharge Diagnosis:   Vaginal birth after  () at 39 weeks gestation  Anemia from acute blood loss            Procedures:   Procedure(s): Repair of second degree perineal laceration       No procedures performed during this admission           Medications Prior to Admission:     Prescriptions Prior to Admission   Medication Sig Dispense Refill Last Dose     Probiotic Product (PROBIOTIC DAILY PO)    3/6/2018 at Unknown time     omega-3 acid ethyl esters (LOVAZA) 1 G capsule Take 2 g by mouth 2 times daily   3/6/2018 at Unknown time     Cholecalciferol (VITAMIN D3 PO) Take by mouth daily   3/6/2018 at Unknown time     Prenatal Multivit-Min-Fe-FA (PRENATAL VITAMINS PO)    3/6/2018 at Unknown time             Discharge Medications:     Current Discharge Medication List      START taking these medications    Details   ibuprofen (ADVIL/MOTRIN) 800 MG tablet Take 1 tablet (800 mg) by mouth every 8 hours as needed for other (cramping)  Qty: 90 tablet, Refills: 0    Associated Diagnoses: Vaginal birth after  ()      senna-docusate (SENOKOT-S;PERICOLACE) 8.6-50 MG per tablet Take 1 tablet by mouth 2 times daily as needed for constipation  Qty: 100 tablet, Refills: 0    Associated Diagnoses: Vaginal birth after  ()      ferrous sulfate (IRON) 325 (65 FE) MG tablet Take 1 tablet (325 mg) by mouth daily (with breakfast)  Qty: 30 tablet, Refills: 2    Associated Diagnoses: Other iron deficiency anemia         CONTINUE these  "medications which have NOT CHANGED    Details   Probiotic Product (PROBIOTIC DAILY PO)       omega-3 acid ethyl esters (LOVAZA) 1 G capsule Take 2 g by mouth 2 times daily      Cholecalciferol (VITAMIN D3 PO) Take by mouth daily      Prenatal Multivit-Min-Fe-FA (PRENATAL VITAMINS PO)                    Consultations:   No consultations were requested during this admission          Brief History of Labor:   Delivery Note  IUP at 39 weeks gestation delivered on 2018.     delivery of a viable Female infant.  Weight : [id number].  Apgars of 8 at 1 minute and 9 at 5 minutes.  Labor was augmented.  Medications administered  in labor:  Pain Rx Epidural; Antibiotics No as pt refused ; Other   Perineum: 2nd degree and R Sulcus tear repaired by Lucie BECKER  Placenta-mechanism: spontaneous, intact,  with a 3 vessel cord. IV oxytocin was given.  Quantitative Blood Loss was 1500.  Complications of pregnancy, labor and delivery: Hemorrhage > 500 cc, Dysfunctional labor, Repetative variables (60x60) and GBS+ untreated by pt refusal  Birth attendants  Hedy AYALA, CNM           Assessment Day of Discharge    Vital signs:  Temp: 97.6  F (36.4  C) Temp src: Oral BP: 120/74 Pulse: 85 Heart Rate: 61 Resp: 16 SpO2: 100 %     Height: 175.3 cm (5' 9\") Weight: 72.4 kg (159 lb 9.6 oz)  Estimated body mass index is 23.57 kg/(m^2) as calculated from the following:    Height as of this encounter: 1.753 m (5' 9\").    Weight as of this encounter: 72.4 kg (159 lb 9.6 oz).    Breasts: Soft, filling  Nipples: Intact, Non-tender  Abdomen: Soft, Non-tender    Diastatis Recti:  3 FB  Uterus: Fundus Firm, Non-tender, located 1 below the umbilicus   Lochia: Light rubra    Perineum:  Well-approximated, No edema  Lower Extremities: No Edema Bilateral, Negative Homans Sign           Hospital Course:   The patient's hospital course was notable for a positive admission anti treponema screen, negative RPR and confirmation treponema antibody " testing pending. I discussed these results with Alexandra and her  today who deny any risk for exposure to syphilis. Alexandra denies any recent or current symptoms or history of syphilis.     The patient did experience a PPH resulting in post-partum anemia. She is agreeable to treatment with PO iron.     On discharge, her pain was well controlled. Vaginal bleeding is similar to peak menstrual flow.  Voiding without difficulty.  No bowel movement. Ambulating well and tolerating a normal diet.  No fever.  Breastfeeding well.  Infant is stable.  She was discharged on post-partum day #1.    Post-partum hemoglobin:   Hemoglobin   Date Value Ref Range Status   03/08/2018 9.2 (L) 11.7 - 15.7 g/dL Final        Rh: postive  Rubella status: immune  Plan for contraception: Defer to primary midwife  Reviewed postpartum warning signs, activity level, avoiding IC for 6 weeks, sitz baths BID, gentle abdominal and pelvic floor exercises exercises, breast care,  postpartum depression/anxiety, s/s pre-eclampsia. Pt verbalized understanding with teach back.          Discharge Instructions and Follow-Up:   Discharge diet: High iron, high fiber   Discharge activity: Light activity for 6 weeks   Discharge follow-up: Follow up with primary care provider in 14 days   Wound care: Drink plenty of fluids  Keep wound clean and dry  BID sitz baths           Discharge Disposition:   Discharged to home with plan to follow up with [facility] tomorrow        JAMIE Alatorre CNM

## 2018-03-08 NOTE — L&D DELIVERY NOTE
Alexandra Agarwal is a 37 year old   at  39w0d presented to labor floor as transfer from [facility] for fetal tachycardia and slow labor progress. Initially transferred to MD team but once fetal tachycardia resolved transferred to CNM practice.  Requested and received epidural anesthesia.  Noted GBS+ but refused ABX prophylaxis. Slowly progressed to Alip/C/0 with BBOW at 1004, consented to AROM for large clear.  Progressed to C/C/+1 with caput to +2 at 1200. Labored down until ~1400 at which time pt attempted pushing effort no fetal descent without having strong urge and without allowing for coaching.  Resumption of passive second stage.  Pt refused pitocin augmentation but agreed to Accupressure which was performed by CNM and  staff.  Multiple position changes to facilitate fetal descent. Pt refused epidural anesthesia titration to increase rectal pressure sensation.  Pt then agreed to Pitocin augmentation which was started at 1600.  After an adequate contraction pattern was established active second stage was resumed at 1720 and pt began to push effectively with RN coaching with noted fetal descent to +3 with pushing effort. FHR with variable decels with pushing effort, moderate variability throughout. Head delivered OA and restituted to ROT . Compound left hand delivered with head. Shoulders easily delivered under maternal effort.  Live female  delivered hands on hands with [facility] midwife staff at 1735 over a perineal laceration under epidural anesthesia.  Spontaneous breath, vigorous cry, well flexed, HR>100. Infant directly to maternal abdomen, skin to skin. Delayed cord clamping until pulsation ceased then clamped x2 and cut by FOB.   20 units of pitocin infusing via IV after baby.  Cord blood obtained for typing.   Intact placenta spontaneously delivered via [place] at 1738.   3 vessel cord. Fundus firm @ U after massage.   Rectum inspected found to be intact.  2nd degree  perineal laceration, R sulcal laceration noted brisk bleeding.  Questionable cervical laceration. Dr. Faulkner called in for repair, see progress note.  Mother and infant stable, continued skin to skin.    Apgars 8/9.  Weight 1vuv08lw.   QBL 1500      Delivery Note  IUP at 39 weeks gestation delivered on 2018.     delivery of a viable Female infant.  Weight : 3ccz70dz.  Apgars of 8 at 1 minute and 9 at 5 minutes.  Labor was augmented.  Medications administered  in labor:  Pain Rx Epidural; Antibiotics No as pt refused ; Other   Perineum: 2nd degree and R Sulcus tear repaired by Lucie BECKER  Placenta-mechanism: spontaneous, intact,  with a 3 vessel cord. IV oxytocin was given.  Quantitative Blood Loss was 1500.  Complications of pregnancy, labor and delivery: Hemorrhage > 500 cc, Dysfunctional labor, Repetative variables (60x60) and GBS+ untreated by pt refusal  Birth attendants  Hedy Cary  APRN, CNM          Delivery Summary - No Reyes  Delivery Summary    Alexandra Agarwal MRN# [id number]   Age: 37 year old YOB: 1980     Labor Event Times:    Labor Onset Date       Labor Onset Time    Dilation Complete Date    Dilation Complete Time       Start Pushing Date        Start Pushing Time            Labor Length:    1st Stage (hrs/min) 19.00 0.00   2nd Stage (hrs/min) 5.00 35.00   3rd Stage (hrs/min) 0.00 3.00       Labor Events:     Labor No   Rupture Date     Rupture Time     Rupture Type Artificial Rupture of Membranes   Fluid Color     Labor Type     Induction    Induction Indication         Augmentation    Labor Complications     Additional Complications     Management of Labor        Antibiotics     IV Antibiotic Given     Additional Management     Fetal Status Prior to  Delivery     Fetal Status Comments         Cervical Ripening:    Date     Time     Type         Delivery:    Episiotomy None   Local Anesthetic        Lacerations 2nd   Sponge Count Correct       Needle Count  "Correct     Final Count by:    Sutures     Blood loss (ml)    Packing Intentionally Left In     Number     Comments           Information for the patient's :  Syeda Agarwal [[id number]]       Delivery  3/7/2018 5:35 PM by  , Spontaneous  Sex:  female Gestational Age: 39w0d  Delivery Clinician:     Living?:            APGARS  One minute Five minutes Ten minutes   Skin color:            Heart rate:            Grimace:            Muscle tone:            Breathing:            Totals: 8  9         Presentation/position:           Resuscitation and Interventions: Method:  None  Oxygen Type:     Intubation Time:   # of Attempts:     ETT Size:        Tracheal Suction:     Tracheal returns:      Hermon Care at Delivery:  Spontaneous cry at delivery.  Hermon to mother's chest skin to skin.  Delayed cord clamping x1 minute per provider.        Cord information:     Disposition of cord blood:      Blood gases sent?    Complications:     Placenta: Delivered:           appearance.  Comments: Brendan.  Disposition: Hospital disposal  Hermon Measurements:  Weight: 6 lb 15 oz (3147 g)  Height: 20\"  Head circumference: 34.9 cm  Chest circumference:     Temperature:     Other providers:       Additional  information:  Forceps:    Verbal Informed Consent Obtained:       Alternative Labor Strategies Discussed:     Emergency Resources Available:       Type:       Accrued Pulling Time:       # of Pulls:      Position:     Fetal Station:       Indications:      Other Indications:     Operative Vaginal Delivery Brief Note Forceps:        Vacuum:    Verbal Informed Consent Obtained:     Alternative Labor Strategies Discussed:     Emergency Resources Available:     Type:      Accrued Pulling Time:       # of Pop-Offs:       # of Pulls:       Position:     Fetal Station:      Indications for Vacuum:       Other Indications:    Operative Vaginal Delivery Brief Note Vacuum:        Shoulder Dystocia Shoulder Dystocia    Fetal " Tracing Prior to Delivery:  Category 2   Shoulder dystocia present?:  No                                            Breech:       : Type:     Indications for Primary:     Indications for Secondary:     Other Indications:        Observed anomalies     Output in Delivery Room:

## 2018-03-08 NOTE — PROGRESS NOTES
Called to patient room s/p  for vaginal laceration and protrusion of tissue from introitus. Upon further exam, noted to have very swollen anterior lip of cervix with protrusion of necrotic cervical tissue through os, but after walking around cervix with ring forceps did not note any lacerations or other cervical lesions. 2nd degree perineal laceration repaired with 3-0 vicryl and right sulcal laceration repaired with 3-0 vicryl with good hemostasis. Necrotic piece of cervical tissue excised with hastings scissors and did not bleed much, but over-sewed with 3-0 vicryl in figure-of-eight fashion. After all repairs, fundal massage revealed firm uterus at umbilicus and no further vaginal bleeding.     Counts correct x 2    Carmen Faulkner MD  3/7/18  6:15pm

## 2018-03-08 NOTE — PROVIDER NOTIFICATION
03/08/18 1053   Provider Notification   Provider Name/Title Norahven   Method of Notification Electronic Page   Request Evaluate-Remote   Notification Reason Other  (can you put d/c order in?)

## 2018-03-08 NOTE — PLAN OF CARE
Problem: Patient Care Overview  Goal: Plan of Care/Patient Progress Review  Outcome: Improving  Data: Vital signs within normal limits. Postpartum checks within normal limits - see flow record. Patient eating and drinking normally. Patient able to empty bladder independently and is up ambulating. Patient performing self cares and is able to care for infant. Breastfeeding on demand, assistance with obtaining a deeper latch. Educated patient on how to hand express breast milk.    Action: Pain has been adequately managed with Ibuprofen and Tylenol. Patient education done about breast massage, hand expression, skin to skin benefits, and bulb syringe use.   Response: Positive attachment behaviors observed with infant. Support person, spouse: Gorge, present.   Plan: Continue with the plan of care.

## 2018-03-08 NOTE — PLAN OF CARE
Problem: Patient Care Overview  Goal: Plan of Care/Patient Progress Review  Outcome: Adequate for Discharge Date Met: 03/08/18  Pt.'s vitals are stable and pain is adequately managed with tylenol and ibuprofen. Breastfeeding well and bonding well with infant. EDS completed and patient scored 6. Pt. requested early discharge which was verified with physician. Paper work is complete. Discharge education was done and medication given. Pt signed a release of information form so that treponema results can be faxed to [facility]. Dr. Moyer signed the forms that parents declined Hepatitis B vaccine, Vitamin K injection, and erythromycin eye ointment. Pt also signed that she declines bilirubin/metabilic screen draw and the [facility] screen (since she is d/c prior to 24 hours). Pt. will discharge to home.

## 2018-03-08 NOTE — PLAN OF CARE
Pt transferred to [facility] via wheelchair at 1945. ID bands double checked with SHANICE Robles. Report given over the phone prior to transfer. Oriented to room, call light system, and purple folder.  at bedside.

## 2018-03-09 LAB — T PALLIDUM AB SER QL AGGL: NON REACTIVE

## 2019-11-03 ENCOUNTER — HEALTH MAINTENANCE LETTER (OUTPATIENT)
Age: 39
End: 2019-11-03

## 2020-11-16 ENCOUNTER — HEALTH MAINTENANCE LETTER (OUTPATIENT)
Age: 40
End: 2020-11-16

## 2021-09-07 ENCOUNTER — TRANSFERRED RECORDS (OUTPATIENT)
Dept: MULTI SPECIALTY CLINIC | Facility: CLINIC | Age: 41
End: 2021-09-07

## 2021-09-07 LAB — PAP-ABSTRACT: NORMAL

## 2021-09-18 ENCOUNTER — HEALTH MAINTENANCE LETTER (OUTPATIENT)
Age: 41
End: 2021-09-18

## 2022-01-08 ENCOUNTER — HEALTH MAINTENANCE LETTER (OUTPATIENT)
Age: 42
End: 2022-01-08

## 2022-11-19 ENCOUNTER — HEALTH MAINTENANCE LETTER (OUTPATIENT)
Age: 42
End: 2022-11-19

## 2023-04-06 ENCOUNTER — OFFICE VISIT (OUTPATIENT)
Dept: DERMATOLOGY | Facility: CLINIC | Age: 43
End: 2023-04-06
Payer: COMMERCIAL

## 2023-04-06 DIAGNOSIS — Z12.83 SKIN CANCER SCREENING: ICD-10-CM

## 2023-04-06 DIAGNOSIS — D22.9 MULTIPLE MELANOCYTIC NEVI: Primary | ICD-10-CM

## 2023-04-06 DIAGNOSIS — D18.01 CHERRY ANGIOMA: ICD-10-CM

## 2023-04-06 PROCEDURE — 99203 OFFICE O/P NEW LOW 30 MIN: CPT | Performed by: DERMATOLOGY

## 2023-04-06 ASSESSMENT — PAIN SCALES - GENERAL: PAINLEVEL: NO PAIN (0)

## 2023-04-06 NOTE — LETTER
"4/6/2023       RE: Alexandra Agarwal  [address]   [place] [address]     Dear Colleague,    Thank you for referring your patient, Alexandra Agarwal, to the [facility] [place] [facility]. Please see a copy of my visit note below.    [facility] Dermatology Note  Encounter Date: Apr 6, 2023  Office Visit     Dermatology Problem List:  1. Actinic Keratosis, Nose  - s/p LN2 12/15/2022  2. No family history of melanoma    ____________________________________________    Assessment & Plan:  # Actinic keratosis, resolved  * Reviewed prior external note(s): CareEverywhere information from [facility]  reviewed   - s/p LN2 12/15/2022   - Will follow affected area clinically    # Benign Lesions  - few scattered melanocytic nevi over the back  - cherry angiomas  - ABCDEs: Counseled ABCDEs of melanoma: Asymmetry, Border (irregularity), Color (not uniform, changes in color), Diameter (greater than 6 mm which is about the size of a pencil eraser), and Evolving (any changes in preexisting moles).  - Sun protection: Counseled SPF30+ sunscreen, UPF clothing, sun avoidance, tanning bed avoidance.  - Follow up in 1-2 years for skin checks as needed.     Procedures Performed:   None.    Follow-up: 1 year(s) in-person, or earlier for new or changing lesions    Staff and Medical Student:     I, Paul Chakraborty, discussed and evaluated the patient with Dr. Hollis.     Paul Chakraborty, MS3    I was present with the medical student who participated in the service and in the documentation.  I have verified the history and personally performed the physical exam and medical decision making.  I agree with the assessment and plan of care as documented in the note.     Ashkan Hollis MD  Dermatology Attending    ____________________________________________    CC: Skin Check (Alexandra is here today for a skin check. She states \" I have a spot on my nose that " "concerns me today x7 months.\")    HPI:  Ms. Alexandra Agarwal is a(n) 43 year old female who presents today as a new patient for an AK on the nose and review of the moles on her back. She had the AK treated at [facility] in 2022 with LN2. It has nearly resolved at this point.     She wears sunscreen daily of at least 30 SPF. She used to not wear sunscreen when she was younger and can recall one severe sunburn that particularly affected her nose. No personal or family history of melanoma. No other skin conditions noted.     Patient is otherwise feeling well, without additional skin concerns.    Labs:  None reviewed.    Physical Exam:  Vitals: There were no vitals taken for this visit.  SKIN: Focused examination of face and back was performed.  - Small, erythematous macule over the left nose; dermatoscope reveals follicularly based erythema.   - A few regular brown pigmented macules and papules are identified on the back.  - There are dome shaped bright red papules on the abdomen.    - No other lesions of concern on areas examined.     Medications:  Current Outpatient Medications   Medication    Cholecalciferol (VITAMIN D3 PO)    Probiotic Product (PROBIOTIC DAILY PO)    vitamin B complex with vitamin C (VITAMIN  B COMPLEX) tablet    ferrous sulfate (IRON) 325 (65 FE) MG tablet    ibuprofen (ADVIL/MOTRIN) 800 MG tablet    omega-3 acid ethyl esters (LOVAZA) 1 G capsule    Prenatal Multivit-Min-Fe-FA (PRENATAL VITAMINS PO)    senna-docusate (SENOKOT-S;PERICOLACE) 8.6-50 MG per tablet     No current facility-administered medications for this visit.      Past Medical History:   Patient Active Problem List   Diagnosis    H/O:     Vaginal birth after  ()     Past Medical History:   Diagnosis Date    NO ACTIVE PROBLEMS         CC No referring provider defined for this encounter. on close of this encounter.   "

## 2023-04-06 NOTE — PROGRESS NOTES
"[facility] Dermatology Note  Encounter Date: Apr 6, 2023  Office Visit     Dermatology Problem List:  1. Actinic Keratosis, Nose  - s/p LN2 12/15/2022  2. No family history of melanoma    ____________________________________________    Assessment & Plan:  # Actinic keratosis, resolved  * Reviewed prior external note(s): CareEverywhere information from [facility]  reviewed   - s/p LN2 12/15/2022   - Will follow affected area clinically    # Benign Lesions  - few scattered melanocytic nevi over the back  - cherry angiomas  - ABCDEs: Counseled ABCDEs of melanoma: Asymmetry, Border (irregularity), Color (not uniform, changes in color), Diameter (greater than 6 mm which is about the size of a pencil eraser), and Evolving (any changes in preexisting moles).  - Sun protection: Counseled SPF30+ sunscreen, UPF clothing, sun avoidance, tanning bed avoidance.  - Follow up in 1-2 years for skin checks as needed.     Procedures Performed:   None.    Follow-up: 1 year(s) in-person, or earlier for new or changing lesions    Staff and Medical Student:     I, Paul Chakraborty, discussed and evaluated the patient with Dr. Hollis.     Paul Chakraborty, MS3    I was present with the medical student who participated in the service and in the documentation.  I have verified the history and personally performed the physical exam and medical decision making.  I agree with the assessment and plan of care as documented in the note.     Ashkan Hollis MD  Dermatology Attending    ____________________________________________    CC: Skin Check (Alexandra is here today for a skin check. She states \" I have a spot on my nose that concerns me today x7 months.\")    HPI:  Ms. Alexandra Agarwal is a(n) 43 year old female who presents today as a new patient for an AK on the nose and review of the moles on her back. She had the AK treated at [facility] in December 2022 with LN2. It has nearly resolved at this point.     She wears sunscreen " daily of at least 30 SPF. She used to not wear sunscreen when she was younger and can recall one severe sunburn that particularly affected her nose. No personal or family history of melanoma. No other skin conditions noted.     Patient is otherwise feeling well, without additional skin concerns.    Labs:  None reviewed.    Physical Exam:  Vitals: There were no vitals taken for this visit.  SKIN: Focused examination of face and back was performed.  - Small, erythematous macule over the left nose; dermatoscope reveals follicularly based erythema.   - A few regular brown pigmented macules and papules are identified on the back.  - There are dome shaped bright red papules on the abdomen.    - No other lesions of concern on areas examined.     Medications:  Current Outpatient Medications   Medication     Cholecalciferol (VITAMIN D3 PO)     Probiotic Product (PROBIOTIC DAILY PO)     vitamin B complex with vitamin C (VITAMIN  B COMPLEX) tablet     ferrous sulfate (IRON) 325 (65 FE) MG tablet     ibuprofen (ADVIL/MOTRIN) 800 MG tablet     omega-3 acid ethyl esters (LOVAZA) 1 G capsule     Prenatal Multivit-Min-Fe-FA (PRENATAL VITAMINS PO)     senna-docusate (SENOKOT-S;PERICOLACE) 8.6-50 MG per tablet     No current facility-administered medications for this visit.      Past Medical History:   Patient Active Problem List   Diagnosis     H/O:      Vaginal birth after  ()     Past Medical History:   Diagnosis Date     NO ACTIVE PROBLEMS         CC No referring provider defined for this encounter. on close of this encounter.

## 2023-04-06 NOTE — NURSING NOTE
"Dermatology Rooming Note    Alexandra Agarwal's goals for this visit include:   Chief Complaint   Patient presents with     Skin Check     Alexandra is here today for a skin check. She states \" I have a spot on my nose that concerns me today x7 months.\"     Yuridia Hernandes, RMVOLODYMYR  "

## 2023-04-15 ENCOUNTER — HEALTH MAINTENANCE LETTER (OUTPATIENT)
Age: 43
End: 2023-04-15

## 2023-04-29 PROBLEM — Z12.83 SKIN CANCER SCREENING: Status: ACTIVE | Noted: 2023-04-29

## 2023-04-29 PROBLEM — D18.01 CHERRY ANGIOMA: Status: ACTIVE | Noted: 2023-04-29

## 2023-04-29 PROBLEM — D22.9 MULTIPLE MELANOCYTIC NEVI: Status: ACTIVE | Noted: 2023-04-29

## 2023-05-02 ENCOUNTER — HOSPITAL ENCOUNTER (OUTPATIENT)
Dept: MAMMOGRAPHY | Facility: CLINIC | Age: 43
Discharge: HOME | End: 2023-05-02
Admitting: STUDENT IN AN ORGANIZED HEALTH CARE EDUCATION/TRAINING PROGRAM
Payer: COMMERCIAL

## 2023-05-02 DIAGNOSIS — Z12.31 VISIT FOR SCREENING MAMMOGRAM: ICD-10-CM

## 2023-05-02 PROCEDURE — 77067 SCR MAMMO BI INCL CAD: CPT

## 2023-07-11 ENCOUNTER — TELEPHONE (OUTPATIENT)
Dept: DERMATOLOGY | Facility: CLINIC | Age: 43
End: 2023-07-11
Payer: COMMERCIAL

## 2023-07-11 NOTE — TELEPHONE ENCOUNTER
My chart message sent to pt per her request.    Catalina MIXON RN  [facility]  [phone]

## 2023-07-11 NOTE — TELEPHONE ENCOUNTER
[facility] Call Center    Phone Message    May a detailed message be left on voicemail: yes     Reason for Call: Other: Pt calling to reschedule 8/18 appt per provider changing schedule. Pt prefer to have appt options sent to [facility] and will communicate that way. Thanks!      Action Taken: Message routed to:  [facility] DERM    Travel Screening: Not Applicable

## 2023-07-26 ENCOUNTER — OFFICE VISIT (OUTPATIENT)
Dept: DERMATOLOGY | Facility: CLINIC | Age: 43
End: 2023-07-26
Payer: COMMERCIAL

## 2023-07-26 DIAGNOSIS — I78.1 TELANGIECTASIA: ICD-10-CM

## 2023-07-26 DIAGNOSIS — L57.8 ACTINIC SKIN DAMAGE: Primary | ICD-10-CM

## 2023-07-26 DIAGNOSIS — L71.9 ROSACEA: ICD-10-CM

## 2023-07-26 PROCEDURE — 99214 OFFICE O/P EST MOD 30 MIN: CPT | Performed by: STUDENT IN AN ORGANIZED HEALTH CARE EDUCATION/TRAINING PROGRAM

## 2023-07-26 NOTE — PROGRESS NOTES
[facility] Dermatology Note    Encounter Date: Jul 26, 2023    Dermatology Problem List:  -  ______________________________________    Impression/Plan:  Alexandra was seen today for skin check.    Diagnoses and all orders for this visit:    Actinic skin damage  - benign    Rosacea  -ET type, mild  - Discussed treatments including regular sunscreen application as well as prescription topicals such as MetroGel or azelaic acid  - Given the mild nature and the fact that she is not particularly bothered by it defers prescription treatment for now, but encouraged her to reach out if she should change her mind    Telangiectasia  - benign  -Discussed that there is not any residual signs of an actinic keratosis and its possible the telangiectasia formed idiopathically, as a result of healing and response to previous cryotherapy, or as part of the natural course of her mild rosacea        Follow-up PRN.       Staff Involved:  Staff Only    Eric Carlisle MD   of Dermatology  Department of Dermatology  [facility]      CC:   Chief Complaint   Patient presents with    Skin Check     Spot on nose       History of Present Illness:  Ms. Alexandra Agarwal is a 43 year old female who presents as a return patient.    Had ak on the nose that was frozen, checked by Dr. Hollis in April     Labs:      Physical exam:  Vitals: There were no vitals taken for this visit.  GEN: well developed, well-nourished, in no acute distress, in a pleasant mood.     SKIN: Matta phototype 1  - Sun-exposed skin, which includes the head/face, neck, both arms, digits, and/or nails was examined.   - Flat brown macules and patches in a sun exposed areas on face and extremities.  - erythema and telangiectasia involving forehead, cheeks, and chin   - No other lesions of concern on areas examined.     Past Medical History:   Past Medical History:   Diagnosis Date    NO ACTIVE PROBLEMS      Past  Surgical History:   Procedure Laterality Date     SECTION      GYN SURGERY  15    surgical birth       Social History:   reports that she has never smoked. She has never used smokeless tobacco. She reports current alcohol use of about 1.0 - 2.0 standard drink of alcohol per week. She reports that she does not use drugs.    Family History:  Family History   Problem Relation Age of Onset    Anxiety Disorder Sister     Substance Abuse Paternal Grandfather     Mental Illness Father     Mental Illness Paternal Grandfather     Depression Father     Depression Paternal Grandfather     Depression Sister     Diabetes Father     Hypertension Father     Thyroid Disease Mother     Obesity Father     Prostate Cancer Paternal Grandfather     Other Cancer Mother        Medications:  Current Outpatient Medications   Medication Sig Dispense Refill    Cholecalciferol (VITAMIN D3 PO) Take by mouth daily      Probiotic Product (PROBIOTIC DAILY PO)       vitamin B complex with vitamin C (VITAMIN  B COMPLEX) tablet Take 1 tablet by mouth daily      ferrous sulfate (IRON) 325 (65 FE) MG tablet Take 1 tablet (325 mg) by mouth daily (with breakfast) 30 tablet 2    ibuprofen (ADVIL/MOTRIN) 800 MG tablet Take 1 tablet (800 mg) by mouth every 8 hours as needed for other (cramping) 90 tablet 0    omega-3 acid ethyl esters (LOVAZA) 1 G capsule Take 2 g by mouth 2 times daily      Prenatal Multivit-Min-Fe-FA (PRENATAL VITAMINS PO)       senna-docusate (SENOKOT-S;PERICOLACE) 8.6-50 MG per tablet Take 1 tablet by mouth 2 times daily as needed for constipation 100 tablet 0     Allergies   Allergen Reactions    Penicillin G Itching    Amoxicillin-Pot Clavulanate Rash

## 2023-07-26 NOTE — LETTER
7/26/2023         RE: Alexandra Agarwal  [address] [facility] [address]        Dear Colleague,    Thank you for referring your patient, Alexandra Agarwal, to the [facility]. Please see a copy of my visit note below.    [facility] Dermatology Note    Encounter Date: Jul 26, 2023    Dermatology Problem List:  -  ______________________________________    Impression/Plan:  Alexandra was seen today for skin check.    Diagnoses and all orders for this visit:    Actinic skin damage  - benign    Rosacea  -ET type, mild  - Discussed treatments including regular sunscreen application as well as prescription topicals such as MetroGel or azelaic acid  - Given the mild nature and the fact that she is not particularly bothered by it defers prescription treatment for now, but encouraged her to reach out if she should change her mind    Telangiectasia  - benign  -Discussed that there is not any residual signs of an actinic keratosis and its possible the telangiectasia formed idiopathically, as a result of healing and response to previous cryotherapy, or as part of the natural course of her mild rosacea        Follow-up PRN.       Staff Involved:  Staff Only    Eric Carlisle MD   of Dermatology  Department of Dermatology  [facility]      CC:   Chief Complaint   Patient presents with     Skin Check     Spot on nose       History of Present Illness:  Ms. Alexandra Agarwal is a 43 year old female who presents as a return patient.    Had ak on the nose that was frozen, checked by Dr. Hollis in April     Labs:      Physical exam:  Vitals: There were no vitals taken for this visit.  GEN: well developed, well-nourished, in no acute distress, in a pleasant mood.     SKIN: Matta phototype 1  - Sun-exposed skin, which includes the head/face, neck, both arms, digits, and/or nails was examined.   - Flat brown macules and patches in a sun  exposed areas on face and extremities.  - erythema and telangiectasia involving forehead, cheeks, and chin   - No other lesions of concern on areas examined.     Past Medical History:   Past Medical History:   Diagnosis Date     NO ACTIVE PROBLEMS      Past Surgical History:   Procedure Laterality Date      SECTION       GYN SURGERY  15    surgical birth       Social History:   reports that she has never smoked. She has never used smokeless tobacco. She reports current alcohol use of about 1.0 - 2.0 standard drink of alcohol per week. She reports that she does not use drugs.    Family History:  Family History   Problem Relation Age of Onset     Anxiety Disorder Sister      Substance Abuse Paternal Grandfather      Mental Illness Father      Mental Illness Paternal Grandfather      Depression Father      Depression Paternal Grandfather      Depression Sister      Diabetes Father      Hypertension Father      Thyroid Disease Mother      Obesity Father      Prostate Cancer Paternal Grandfather      Other Cancer Mother        Medications:  Current Outpatient Medications   Medication Sig Dispense Refill     Cholecalciferol (VITAMIN D3 PO) Take by mouth daily       Probiotic Product (PROBIOTIC DAILY PO)        vitamin B complex with vitamin C (VITAMIN  B COMPLEX) tablet Take 1 tablet by mouth daily       ferrous sulfate (IRON) 325 (65 FE) MG tablet Take 1 tablet (325 mg) by mouth daily (with breakfast) 30 tablet 2     ibuprofen (ADVIL/MOTRIN) 800 MG tablet Take 1 tablet (800 mg) by mouth every 8 hours as needed for other (cramping) 90 tablet 0     omega-3 acid ethyl esters (LOVAZA) 1 G capsule Take 2 g by mouth 2 times daily       Prenatal Multivit-Min-Fe-FA (PRENATAL VITAMINS PO)        senna-docusate (SENOKOT-S;PERICOLACE) 8.6-50 MG per tablet Take 1 tablet by mouth 2 times daily as needed for constipation 100 tablet 0     Allergies   Allergen Reactions     Penicillin G Itching     Amoxicillin-Pot  Clavulanate Rash                 Again, thank you for allowing me to participate in the care of your patient.        Sincerely,        Eric Carlisle MD

## 2023-12-18 ENCOUNTER — OFFICE VISIT (OUTPATIENT)
Dept: OBGYN | Facility: CLINIC | Age: 43
End: 2023-12-18
Payer: COMMERCIAL

## 2023-12-18 VITALS
DIASTOLIC BLOOD PRESSURE: 68 MMHG | SYSTOLIC BLOOD PRESSURE: 116 MMHG | WEIGHT: 144 LBS | BODY MASS INDEX: 21.33 KG/M2 | HEIGHT: 69 IN

## 2023-12-18 DIAGNOSIS — Z01.419 ENCOUNTER FOR GYNECOLOGICAL EXAMINATION WITHOUT ABNORMAL FINDING: Primary | ICD-10-CM

## 2023-12-18 DIAGNOSIS — Z13.228 ENCOUNTER FOR SCREENING FOR METABOLIC DISORDER: ICD-10-CM

## 2023-12-18 LAB
ALBUMIN SERPL BCG-MCNC: 4.7 G/DL (ref 3.5–5.2)
ALP SERPL-CCNC: 73 U/L (ref 40–150)
ALT SERPL W P-5'-P-CCNC: 20 U/L (ref 0–50)
ANION GAP SERPL CALCULATED.3IONS-SCNC: 11 MMOL/L (ref 7–15)
AST SERPL W P-5'-P-CCNC: 24 U/L (ref 0–45)
BILIRUB SERPL-MCNC: 0.6 MG/DL
BUN SERPL-MCNC: 11 MG/DL (ref 6–20)
CALCIUM SERPL-MCNC: 9.3 MG/DL (ref 8.6–10)
CHLORIDE SERPL-SCNC: 103 MMOL/L (ref 98–107)
CREAT SERPL-MCNC: 0.72 MG/DL (ref 0.51–0.95)
DEPRECATED HCO3 PLAS-SCNC: 24 MMOL/L (ref 22–29)
EGFRCR SERPLBLD CKD-EPI 2021: >90 ML/MIN/1.73M2
GLUCOSE SERPL-MCNC: 92 MG/DL (ref 70–99)
POTASSIUM SERPL-SCNC: 4.4 MMOL/L (ref 3.4–5.3)
PROT SERPL-MCNC: 7.5 G/DL (ref 6.4–8.3)
SODIUM SERPL-SCNC: 138 MMOL/L (ref 135–145)

## 2023-12-18 PROCEDURE — 36415 COLL VENOUS BLD VENIPUNCTURE: CPT | Performed by: OBSTETRICS & GYNECOLOGY

## 2023-12-18 PROCEDURE — 99386 PREV VISIT NEW AGE 40-64: CPT | Performed by: OBSTETRICS & GYNECOLOGY

## 2023-12-18 PROCEDURE — 80053 COMPREHEN METABOLIC PANEL: CPT | Performed by: OBSTETRICS & GYNECOLOGY

## 2023-12-18 ASSESSMENT — ANXIETY QUESTIONNAIRES
2. NOT BEING ABLE TO STOP OR CONTROL WORRYING: NOT AT ALL
IF YOU CHECKED OFF ANY PROBLEMS ON THIS QUESTIONNAIRE, HOW DIFFICULT HAVE THESE PROBLEMS MADE IT FOR YOU TO DO YOUR WORK, TAKE CARE OF THINGS AT HOME, OR GET ALONG WITH OTHER PEOPLE: NOT DIFFICULT AT ALL
7. FEELING AFRAID AS IF SOMETHING AWFUL MIGHT HAPPEN: NOT AT ALL
GAD7 TOTAL SCORE: 2
GAD7 TOTAL SCORE: 2
6. BECOMING EASILY ANNOYED OR IRRITABLE: NOT AT ALL
3. WORRYING TOO MUCH ABOUT DIFFERENT THINGS: NOT AT ALL
5. BEING SO RESTLESS THAT IT IS HARD TO SIT STILL: NOT AT ALL
1. FEELING NERVOUS, ANXIOUS, OR ON EDGE: SEVERAL DAYS

## 2023-12-18 ASSESSMENT — ENCOUNTER SYMPTOMS
HEMATOCHEZIA: 1
NERVOUS/ANXIOUS: 1
HEMATURIA: 0
CHILLS: 0
FEVER: 0
PALPITATIONS: 0
ABDOMINAL PAIN: 1
FREQUENCY: 0
DIARRHEA: 0
ARTHRALGIAS: 0
COUGH: 0
SORE THROAT: 0
BREAST MASS: 0
HEADACHES: 0
EYE PAIN: 0
JOINT SWELLING: 0
PARESTHESIAS: 0
HEARTBURN: 0
CONSTIPATION: 0
DIZZINESS: 0
WEAKNESS: 0
NAUSEA: 0
DYSURIA: 0
MYALGIAS: 0
SHORTNESS OF BREATH: 0

## 2023-12-18 ASSESSMENT — PATIENT HEALTH QUESTIONNAIRE - PHQ9
5. POOR APPETITE OR OVEREATING: SEVERAL DAYS
SUM OF ALL RESPONSES TO PHQ QUESTIONS 1-9: 0

## 2023-12-18 NOTE — PROGRESS NOTES
Alexandra is a 43 year old  female who presents for annual exam.     Besides routine health maintenance, she has no other health concerns today .    HPI:  The patient's PCP is  Physician No Ref-Primary.      UTD mammogram.     Has had RUQ pain in stomach for several years, specific spot. Has had extensive workup in past with [facility]-endoscopy, ultrasound, CT. Never resulted in anything. Does go to acupuncture as well, heps sometimes. Wants yearly labs.  Not aware of triggers or food relations. Has not kept a food journal. Not in a place where she is willing to eliminate foods. WOuld be willing to follow a plan if provided for her.   Going to pelvic physical therapy in , has been there in past.   Periods are regular each month.   Has periodic hemorrhoid flares.             GYNECOLOGIC HISTORY:    Patient's last menstrual period was 2023.    Regular menses? yes  Menses every 30 days.  Length of menses: 5 days    Her current contraception method is: none.  She  reports that she has never smoked. She has never used smokeless tobacco.    Patient is not sexually active.  STD testing offered?  Declined  Last PHQ-9 score on record =       2023    11:35 AM   PHQ-9 SCORE   PHQ-9 Total Score 0     Last GAD7 score on record =       2023    11:35 AM   COLE-7 SCORE   Total Score 2     Alcohol Score = 1    HEALTH MAINTENANCE:  Care Gaps  Close care gaps     Overdue          Never done YEARLY PREVENTIVE VISIT (Yearly)       Never done ADVANCE CARE PLANNING (Every 5 Years)       Never done HEPATITIS C SCREENING (Once)            Never done INFLUENZA VACCINE (1)       SEP 1   2023 COVID-19 Vaccine (3 - 2023-24 season)  Last completed: May 20, 2021        Upcoming          DEC 28   2032 DTAP/TDAP/TD IMMUNIZATION (4 - Td or Tdap)   Last completed: Dec 28, 2022     Health maintenance updated:  yes    HISTORY:  OB History    Para Term  AB Living   2 2 2 0 0 2   SAB IAB Ectopic Multiple Live Births   0 0 0  0 2      # Outcome Date GA Lbr Hao/2nd Weight Sex Delivery Anes PTL Lv   2 Term 18 39w0d 19:00 / 05:35 3.147 kg (6 lb 15 oz) F  EPI, Nitrous N LISSETT      Complications: Dysfunctional Labor, GBS      Name: PAKO DE LA GARZA      Apgar1: 8  Apgar5: 9   1 Term 14 39w3d 24:30 / 08:00 3.6 kg (7 lb 15 oz) M  EPI N LISSETT      Complications: Failure to Progress in Second Stage      Apgar1: 7  Apgar5: 8       Patient Active Problem List   Diagnosis    H/O:     Vaginal birth after  ()    Multiple melanocytic nevi    Skin cancer screening    Cherry angioma     Past Surgical History:   Procedure Laterality Date     SECTION      GYN SURGERY  15    surgical birth      Social History     Tobacco Use    Smoking status: Never    Smokeless tobacco: Never   Substance Use Topics    Alcohol use: Yes     Alcohol/week: 1.0 - 2.0 standard drink of alcohol     Types: 1 - 2 Standard drinks or equivalent per week      Problem (# of Occurrences) Relation (Name,Age of Onset)    Anxiety Disorder (1) Sister    Substance Abuse (1) Paternal Grandfather    Mental Illness (2) Father, Paternal Grandfather    Depression (3) Father, Sister, Paternal Grandfather    Diabetes (1) Father    Hypertension (1) Father    Thyroid Disease (1) Mother    Obesity (1) Father    Prostate Cancer (1) Paternal Grandfather    Other Cancer (1) Mother    Lymphoma (1) Mother              Current Outpatient Medications   Medication Sig    Cholecalciferol (VITAMIN D3 PO) Take by mouth daily    Probiotic Product (PROBIOTIC DAILY PO)     vitamin B complex with vitamin C (VITAMIN  B COMPLEX) tablet Take 1 tablet by mouth daily    ferrous sulfate (IRON) 325 (65 FE) MG tablet Take 1 tablet (325 mg) by mouth daily (with breakfast)    ibuprofen (ADVIL/MOTRIN) 800 MG tablet Take 1 tablet (800 mg) by mouth every 8 hours as needed for other (cramping)    omega-3 acid ethyl esters (LOVAZA) 1 G capsule Take 2 g by mouth 2 times daily     "Prenatal Multivit-Min-Fe-FA (PRENATAL VITAMINS PO)     senna-docusate (SENOKOT-S;PERICOLACE) 8.6-50 MG per tablet Take 1 tablet by mouth 2 times daily as needed for constipation     No current facility-administered medications for this visit.     Allergies   Allergen Reactions    Penicillin G Itching    Amoxicillin-Pot Clavulanate Rash       Past medical, surgical, social and family histories were reviewed and updated in EPIC.    ROS:   12 point review of systems negative other than symptoms noted below or in the HPI.  No urinary frequency or dysuria, bladder or kidney problems    EXAM:  /68   Ht 1.753 m (5' 9\")   Wt 65.3 kg (144 lb)   LMP 12/08/2023   BMI 21.27 kg/m     BMI: Body mass index is 21.27 kg/m .    PHYSICAL EXAM:  Constitutional:   Appearance: Well nourished, well developed, alert, in no acute distress  Neck:  Lymph Nodes:  No lymphadenopathy present    Thyroid:  Gland size normal, nontender, no nodules or masses present  on palpation  Chest:  Respiratory Effort:  Breathing unlabored  Cardiovascular:    Heart: Auscultation:  Regular rate, normal rhythm, no murmurs present  Breasts: Inspection of Breasts:  No lymphadenopathy present., Palpation of Breasts and Axillae:  No masses present on palpation, no breast tenderness., Axillary Lymph Nodes:  No lymphadenopathy present., and No nodularity, asymmetry or nipple discharge bilaterally.  Gastrointestinal:   Abdominal Examination:  Abdomen nontender to palpation, tone normal without rigidity or guarding, no masses present, umbilicus without lesions   Liver and Spleen:  No hepatomegaly present, liver nontender to palpation    Hernias:  No hernias present  Lymphatic: Lymph Nodes:  No other lymphadenopathy present  Skin:  General Inspection:  No rashes present, no lesions present, no areas of  discoloration  Neurologic:    Mental Status:  Oriented X3.  Normal strength and tone, sensory exam                grossly normal, mentation intact and speech " normal.    Psychiatric:   Mentation appears normal and affect normal/bright.         Pelvic Exam:  External Genitalia:     Normal appearance for age, no discharge present, no tenderness present, no inflammatory lesions present, color normal  Vagina:    Normal vaginal vault without central or paravaginal defects, no discharge present, no inflammatory lesions present, no masses present  Bladder:     Nontender to palpation  Urethra:   Urethral Body:  Urethra palpation normal, urethra structural support normal   Urethral Meatus:  No erythema or lesions present  Cervix:     Appearance healthy, no lesions present, nontender to palpation, no bleeding present, string present  Uterus:     Nontender to palpation, no masses present, position anteflexed, mobility: normal  Adnexa:     No adnexal tenderness present, no adnexal masses present  Perineum:     Perineum within normal limits, no evidence of trauma, no rashes or skin lesions present  Anus:     Anus within normal limits, no hemorrhoids present  Inguinal Lymph Nodes:     No lymphadenopathy present  Pubic Hair:     Normal pubic hair distribution for age  Genitalia and Groin:     No rashes present, no lesions present, no areas of discoloration, no masses present    COUNSELING:   Reviewed preventive health counseling, as reflected in patient instructions       Contraception       Family planning       Osteoporosis prevention/bone health    BMI: Body mass index is 21.27 kg/m .      ASSESSMENT:  43 year old female with satisfactory annual exam.    ICD-10-CM    1. Encounter for gynecological examination without abnormal finding  Z01.419 Comprehensive metabolic panel (BMP + Alb, Alk Phos, ALT, AST, Total. Bili, TP)     Comprehensive metabolic panel (BMP + Alb, Alk Phos, ALT, AST, Total. Bili, TP)      2. Encounter for screening for metabolic disorder  Z13.228 Comprehensive metabolic panel (BMP + Alb, Alk Phos, ALT, AST, Total. Bili, TP)     Comprehensive metabolic panel (BMP +  Alb, Alk Phos, ALT, AST, Total. Bili, TP)          PLAN:  -UTD for cervical cancer screening.    -Breast self awareness discussed. UTD for mammogram.  -Colonoscopy due age 46  -Osteoporosis prevention discussed.  -Desires metabolic labs.  Did discuss having a PCP she can see for general medical issues outside of our specialty here  Will be seeing physical therapy in Jan for another look at tx her RUQ pain. Reviewed with Alexandra the fact that no major abnormality has been seen/found on her extensive workup which does provide some reassurance.   -Return one year for next annual exam        Sonali Killian Masters, DO

## 2023-12-18 NOTE — PATIENT INSTRUCTIONS
-Daily total calcium intake (between food/supplements) should be 1000mg which equates to 3-4 servings calcium containing food per day; VItamin D 1000IU.   Foods rich in calcium are: milk, cheese, yogurt, seafood, sardines and canned salmon, leafy green vegetables such as jason greens, spinach and kale, beans and lentils, almonds, seeds (poppy, sesame, celery, beatris), rhubarb, dried fruit such as figs, whey protein, tofu and edamame, amaranth, other foods with added calcium such as orange juice and some cereals.   If adequate amount not taken in diet, then a supplement may be needed.     -I also recommend increasing your dietary fiber by starting Metamucil (powder mixed in glass of water) once to twice daily

## 2024-04-09 ENCOUNTER — OFFICE VISIT (OUTPATIENT)
Dept: DERMATOLOGY | Facility: CLINIC | Age: 44
End: 2024-04-09
Payer: COMMERCIAL

## 2024-04-09 ENCOUNTER — MYC MEDICAL ADVICE (OUTPATIENT)
Dept: DERMATOLOGY | Facility: CLINIC | Age: 44
End: 2024-04-09

## 2024-04-09 ENCOUNTER — TELEPHONE (OUTPATIENT)
Dept: DERMATOLOGY | Facility: CLINIC | Age: 44
End: 2024-04-09

## 2024-04-09 DIAGNOSIS — L57.0 AK (ACTINIC KERATOSIS): Primary | ICD-10-CM

## 2024-04-09 DIAGNOSIS — D22.9 MULTIPLE MELANOCYTIC NEVI: ICD-10-CM

## 2024-04-09 PROCEDURE — 99213 OFFICE O/P EST LOW 20 MIN: CPT | Performed by: DERMATOLOGY

## 2024-04-09 ASSESSMENT — PAIN SCALES - GENERAL: PAINLEVEL: NO PAIN (0)

## 2024-04-09 NOTE — TELEPHONE ENCOUNTER
Left Voicemail (1st Attempt) and Sent Mychart (1st Attempt) for the patient to call back and schedule the following:    Appointment type: follow up  Provider: Bunny  Return date: 10/24  Specialty phone number: [phone]  Additional appointment(s) needed: na  Additonal Notes: na

## 2024-04-09 NOTE — PROGRESS NOTES
[facility] Dermatology Note  Encounter Date: Apr 9, 2024  Office Visit      Dermatology Problem List:  1. Actinic Keratosis, Nose  - s/p LN2 12/15/2022  2. No family history of melanoma     ____________________________________________     Assessment & Plan:  # Actinic keratosis, nose, resolved  * Reviewed prior external note(s): CareEverywhere information from [employer]  reviewed   - s/p LN2 12/15/2022   - Will follow affected area clinically    # Persistent pink macule of nasal dorsum, unchanged vs last exam in 2023.  DDx small benign keratosis vs fibrous papule.  Unlikely BCC or other NMSC.  - We agreed on followup in 6 months, with consideration of cryotherapy or small biopsy at that time if persistent     # Benign Lesions  - few scattered melanocytic nevi over the back  - cherry angiomas  - ABCDEs: Counseled ABCDEs of melanoma: Asymmetry, Border (irregularity), Color (not uniform, changes in color), Diameter (greater than 6 mm which is about the size of a pencil eraser), and Evolving (any changes in preexisting moles).  - Sun protection: Counseled SPF30+ sunscreen, UPF clothing, sun avoidance, tanning bed avoidance.  - Follow up in 1-2 years for skin checks as needed.      Procedures Performed:   None.     Follow-up: 6 months in-person, or earlier for new or changing lesions       Ashkan Hollis MD  Dermatology Attending     ____________________________________________     CC: Skin Check      HPI:  Ms. Alexandra Agarwal is a(n) 44 year old female who presents today as a followup patient for skin check.  Last seen July 2023.  Currently no nonhealing sores or new or changing moles.     Patient is otherwise feeling well, without additional skin concerns.     Labs:  None reviewed.     Physical Exam:  Vitals: There were no vitals taken for this visit.  SKIN: Focused examination of face and back was performed.  - Small, erythematous macule over the left nose; dermatoscope reveals follicularly  based erythema.   - A few regular brown pigmented macules and papules are identified on the back.  - There are dome shaped bright red papules on the abdomen.    - No other lesions of concern on areas examined.

## 2024-04-09 NOTE — LETTER
4/9/2024       RE: Alexandra Agarwal  [address] [address]     Dear Colleague,    Thank you for referring your patient, Alexandra Agarwal, to the [facility] [place] at [facility]. Please see a copy of my visit note below.    [facility] Dermatology Note  Encounter Date: Apr 9, 2024  Office Visit      Dermatology Problem List:  1. Actinic Keratosis, Nose  - s/p LN2 12/15/2022  2. No family history of melanoma     ____________________________________________     Assessment & Plan:  # Actinic keratosis, nose, resolved  * Reviewed prior external note(s): CareEverywhere information from [employer]  reviewed   - s/p LN2 12/15/2022   - Will follow affected area clinically    # Persistent pink macule of nasal dorsum, unchanged vs last exam in 2023.  DDx small benign keratosis vs fibrous papule.  Unlikely BCC or other NMSC.  - We agreed on followup in 6 months, with consideration of cryotherapy or small biopsy at that time if persistent     # Benign Lesions  - few scattered melanocytic nevi over the back  - cherry angiomas  - ABCDEs: Counseled ABCDEs of melanoma: Asymmetry, Border (irregularity), Color (not uniform, changes in color), Diameter (greater than 6 mm which is about the size of a pencil eraser), and Evolving (any changes in preexisting moles).  - Sun protection: Counseled SPF30+ sunscreen, UPF clothing, sun avoidance, tanning bed avoidance.  - Follow up in 1-2 years for skin checks as needed.      Procedures Performed:   None.     Follow-up: 6 months in-person, or earlier for new or changing lesions       Ashkan Hollis MD  Dermatology Attending     ____________________________________________     CC: Skin Check      HPI:  Ms. Alexandra Agarwal is a(n) 44 year old female who presents today as a followup patient for skin check.  Last seen July 2023.  Currently no nonhealing sores or new or changing moles.      Patient is otherwise feeling well, without additional skin concerns.     Labs:  None reviewed.     Physical Exam:  Vitals: There were no vitals taken for this visit.  SKIN: Focused examination of face and back was performed.  - Small, erythematous macule over the left nose; dermatoscope reveals follicularly based erythema.   - A few regular brown pigmented macules and papules are identified on the back.  - There are dome shaped bright red papules on the abdomen.    - No other lesions of concern on areas examined.

## 2024-04-09 NOTE — NURSING NOTE
Chief Complaint   Patient presents with    Skin Check     Patient reports no new lesions of concern. The patient would like a FBSE.      Tasia Snyder LPN

## 2024-04-11 NOTE — TELEPHONE ENCOUNTER
Patient Contacted for the patient to call back and schedule the following:    Appointment type: follow up  Provider: Bunny  Return date: 10/2024  Specialty phone number: [phone]  Additional appointment(s) needed: na  Additonal Notes: patient not interested

## 2024-05-10 PROBLEM — L57.0 AK (ACTINIC KERATOSIS): Status: ACTIVE | Noted: 2024-05-10

## 2025-02-01 ENCOUNTER — HEALTH MAINTENANCE LETTER (OUTPATIENT)
Age: 45
End: 2025-02-01

## 2025-07-12 ENCOUNTER — HEALTH MAINTENANCE LETTER (OUTPATIENT)
Age: 45
End: 2025-07-12